# Patient Record
Sex: FEMALE | Race: WHITE | Employment: FULL TIME | URBAN - METROPOLITAN AREA
[De-identification: names, ages, dates, MRNs, and addresses within clinical notes are randomized per-mention and may not be internally consistent; named-entity substitution may affect disease eponyms.]

---

## 2017-08-11 DIAGNOSIS — Z13.220 SCREENING CHOLESTEROL LEVEL: ICD-10-CM

## 2017-08-11 DIAGNOSIS — Z00.00 ROUTINE GENERAL MEDICAL EXAMINATION AT A HEALTH CARE FACILITY: Primary | ICD-10-CM

## 2017-08-11 DIAGNOSIS — Z13.29 THYROID DISORDER SCREEN: ICD-10-CM

## 2017-08-11 DIAGNOSIS — E55.9 VITAMIN D DEFICIENCY: ICD-10-CM

## 2017-08-14 ENCOUNTER — LAB ONLY (OUTPATIENT)
Dept: INTERNAL MEDICINE CLINIC | Age: 32
End: 2017-08-14

## 2017-08-14 ENCOUNTER — HOSPITAL ENCOUNTER (OUTPATIENT)
Dept: LAB | Age: 32
Discharge: HOME | End: 2017-08-14
Payer: COMMERCIAL

## 2017-08-14 DIAGNOSIS — E55.9 VITAMIN D DEFICIENCY: ICD-10-CM

## 2017-08-14 DIAGNOSIS — Z13.29 THYROID DISORDER SCREEN: ICD-10-CM

## 2017-08-14 DIAGNOSIS — Z00.00 ROUTINE GENERAL MEDICAL EXAMINATION AT A HEALTH CARE FACILITY: Primary | ICD-10-CM

## 2017-08-14 DIAGNOSIS — Z00.00 ROUTINE GENERAL MEDICAL EXAMINATION AT A HEALTH CARE FACILITY: ICD-10-CM

## 2017-08-14 LAB
25(OH)D3 SERPL-MCNC: 29.2 NG/ML (ref 30–100)
ALBUMIN SERPL BCP-MCNC: 3.8 G/DL (ref 3.4–5)
ALBUMIN/GLOB SERPL: 1.3 {RATIO} (ref 0.8–1.7)
ALP SERPL-CCNC: 62 U/L (ref 45–117)
ALT SERPL-CCNC: 17 U/L (ref 13–56)
ANION GAP BLD CALC-SCNC: 9 MMOL/L (ref 3–18)
AST SERPL W P-5'-P-CCNC: 11 U/L (ref 15–37)
BASOPHILS # BLD AUTO: 0 K/UL (ref 0–0.06)
BASOPHILS # BLD: 1 % (ref 0–2)
BILIRUB SERPL-MCNC: 0.5 MG/DL (ref 0.2–1)
BUN SERPL-MCNC: 8 MG/DL (ref 7–18)
BUN/CREAT SERPL: 13 (ref 12–20)
CALCIUM SERPL-MCNC: 8.7 MG/DL (ref 8.5–10.1)
CHLORIDE SERPL-SCNC: 106 MMOL/L (ref 100–108)
CHOLEST SERPL-MCNC: 181 MG/DL
CO2 SERPL-SCNC: 24 MMOL/L (ref 21–32)
CREAT SERPL-MCNC: 0.6 MG/DL (ref 0.6–1.3)
DIFFERENTIAL METHOD BLD: ABNORMAL
EOSINOPHIL # BLD: 0.3 K/UL (ref 0–0.4)
EOSINOPHIL NFR BLD: 6 % (ref 0–5)
ERYTHROCYTE [DISTWIDTH] IN BLOOD BY AUTOMATED COUNT: 13.3 % (ref 11.6–14.5)
GLOBULIN SER CALC-MCNC: 3 G/DL (ref 2–4)
GLUCOSE SERPL-MCNC: 80 MG/DL (ref 74–99)
HCT VFR BLD AUTO: 40.6 % (ref 35–45)
HDLC SERPL-MCNC: 89 MG/DL (ref 40–60)
HDLC SERPL: 2 {RATIO} (ref 0–5)
HGB BLD-MCNC: 13.1 G/DL (ref 12–16)
LDLC SERPL CALC-MCNC: 82.2 MG/DL (ref 0–100)
LIPID PROFILE,FLP: ABNORMAL
LYMPHOCYTES # BLD AUTO: 34 % (ref 21–52)
LYMPHOCYTES # BLD: 1.5 K/UL (ref 0.9–3.6)
MCH RBC QN AUTO: 30.8 PG (ref 24–34)
MCHC RBC AUTO-ENTMCNC: 32.3 G/DL (ref 31–37)
MCV RBC AUTO: 95.3 FL (ref 74–97)
MONOCYTES # BLD: 0.3 K/UL (ref 0.05–1.2)
MONOCYTES NFR BLD AUTO: 7 % (ref 3–10)
NEUTS SEG # BLD: 2.3 K/UL (ref 1.8–8)
NEUTS SEG NFR BLD AUTO: 52 % (ref 40–73)
PLATELET # BLD AUTO: 404 K/UL (ref 135–420)
PMV BLD AUTO: 9 FL (ref 9.2–11.8)
POTASSIUM SERPL-SCNC: 4.8 MMOL/L (ref 3.5–5.5)
PROT SERPL-MCNC: 6.8 G/DL (ref 6.4–8.2)
RBC # BLD AUTO: 4.26 M/UL (ref 4.2–5.3)
SODIUM SERPL-SCNC: 139 MMOL/L (ref 136–145)
TRIGL SERPL-MCNC: 49 MG/DL (ref ?–150)
TSH SERPL DL<=0.05 MIU/L-ACNC: 1.39 UIU/ML (ref 0.36–3.74)
VLDLC SERPL CALC-MCNC: 9.8 MG/DL
WBC # BLD AUTO: 4.4 K/UL (ref 4.6–13.2)

## 2017-08-14 PROCEDURE — 84443 ASSAY THYROID STIM HORMONE: CPT | Performed by: NURSE PRACTITIONER

## 2017-08-14 PROCEDURE — 85025 COMPLETE CBC W/AUTO DIFF WBC: CPT | Performed by: NURSE PRACTITIONER

## 2017-08-14 PROCEDURE — 82306 VITAMIN D 25 HYDROXY: CPT | Performed by: NURSE PRACTITIONER

## 2017-08-14 PROCEDURE — 80061 LIPID PANEL: CPT | Performed by: NURSE PRACTITIONER

## 2017-08-14 PROCEDURE — 80053 COMPREHEN METABOLIC PANEL: CPT | Performed by: NURSE PRACTITIONER

## 2017-08-17 ENCOUNTER — OFFICE VISIT (OUTPATIENT)
Dept: INTERNAL MEDICINE CLINIC | Age: 32
End: 2017-08-17

## 2017-08-17 VITALS
SYSTOLIC BLOOD PRESSURE: 106 MMHG | HEIGHT: 67 IN | HEART RATE: 79 BPM | WEIGHT: 190 LBS | DIASTOLIC BLOOD PRESSURE: 75 MMHG | BODY MASS INDEX: 29.82 KG/M2 | RESPIRATION RATE: 15 BRPM | TEMPERATURE: 98.6 F | OXYGEN SATURATION: 97 %

## 2017-08-17 DIAGNOSIS — Z00.00 ROUTINE PHYSICAL EXAMINATION: Primary | ICD-10-CM

## 2017-08-17 DIAGNOSIS — E55.9 VITAMIN D DEFICIENCY: ICD-10-CM

## 2017-08-17 RX ORDER — DOXYCYCLINE HYCLATE 20 MG
TABLET ORAL
Refills: 0 | COMMUNITY
Start: 2017-07-06 | End: 2018-03-27 | Stop reason: ALTCHOICE

## 2017-08-17 RX ORDER — CHLORHEXIDINE GLUCONATE 1.2 MG/ML
RINSE ORAL
Refills: 0 | COMMUNITY
Start: 2017-07-06 | End: 2018-11-10

## 2017-08-17 NOTE — PROGRESS NOTES
[address] [place]  presents today for   Chief Complaint   Patient presents with    Complete Physical       [facility] for [facility] care discussion is english/other. Is someone accompanying this pt? no    Is the patient using any DME equipment during OV? no    Depression Screening:  PHQ over the last two weeks 10/21/2015 3/11/2014   Little interest or pleasure in doing things Not at all Not at all   Feeling down, depressed or hopeless Not at all Not at all   Total Score PHQ 2 0 0       Learning Assessment:  Learning Assessment 3/11/2014   PRIMARY LEARNER Patient   PRIMARY LANGUAGE ENGLISH   LEARNER PREFERENCE PRIMARY LISTENING   ANSWERED BY patient   RELATIONSHIP SELF       Abuse Screening:  No flowsheet data found. Fall Risk  No flowsheet data found. Health Maintenance reviewed and discussed per provider. Yes    Pt will discuss HM due with PCP      Advance Directive:  1. Do you have an advance directive in place? Patient Reply: no    2. If not, would you like material regarding how to put one in place? Patient Reply: no    Coordination of Care:  1. Have you been to the ER, urgent care clinic since your last visit? Hospitalized since your last visit? no    2. Have you seen or consulted any other health care providers outside of the [address] [place] [address] since your last visit? Include any pap smears or colon screening.  no

## 2017-08-17 NOTE — PATIENT INSTRUCTIONS
Learning About Vitamin D  Why is it important to get enough vitamin D? Your body needs vitamin D to absorb calcium. Calcium keeps your bones and muscles, including your heart, healthy and strong. If your muscles don't get enough calcium, they can cramp, hurt, or feel weak. You may have long-term (chronic) muscle aches and pains. If you don't get enough vitamin D throughout life, you have an increased chance of having thin and brittle bones (osteoporosis) in your later years. Children who don't get enough vitamin D may not grow as much as others their age. They also have a chance of getting a rare disease called rickets. It causes weak bones. Vitamin D and calcium are added to many foods. And your body uses sunshine to make its own vitamin D. How much vitamin D do you need? The [place] of Medicine recommends that people ages 3 through 79 get 600 IU (international units) every day. Adults 71 and older need 800 IU every day. Blood tests for vitamin D can check your vitamin D level. But there is no standard normal range used by all laboratories. The [place] of Medicine recommends a blood level of 20 ng/mL of vitamin D for healthy bones. And most people in the [place] and [facility] ([facility]) meet this goal.  How can you get more vitamin D? Foods that contain vitamin D include:  · [place], tuna, and mackerel. These are some of the best foods to eat when you need to get more vitamin D.  · Cheese, egg yolks, and beef liver. These foods have vitamin D in small amounts. · Milk, soy drinks, orange juice, yogurt, margarine, and some kinds of cereal have vitamin D added to them. Some people don't make vitamin D as well as others. They may have to take extra care in getting enough vitamin D. Things that reduce how much vitamin D your body makes include:  · Dark skin, such as many  Americans have. · Age, especially if you are older than 72. · Digestive problems, such as Crohn's or celiac disease.   · Liver and kidney disease. Some people who do not get enough vitamin D may need supplements. Are there any risks from taking vitamin D?  · Too much vitamin D:  ¨ Can damage your kidneys. ¨ Can cause nausea and vomiting, constipation, and weakness. ¨ Raises the amount of calcium in your blood. If this happens, you can get confused or have an irregular heart rhythm. · Vitamin D may interact with other medicines. Tell your doctor about all of the medicines you take, including over-the-counter drugs, herbs, and pills. Tell your doctor about all of your current medical problems. Where can you learn more? Go to http://haseebSan Diego Operamaurizio.info/. Enter [id number] in the search box to learn more about \"Learning About Vitamin D.\"  Current as of: July 26, 2016  Content Version: 11.3  © 2193-1045 Store-[email or web address].com. Care instructions adapted under license by [employer] (which disclaims liability or warranty for this information). If you have questions about a medical condition or this instruction, always ask your healthcare professional. [place] [address] any warranty or liability for your use of this information. Start taking Vitamin D3 6000units/daily or 50,000units once a week for 8 weeks. Then continue with 1500 to 2000units/day.

## 2017-08-17 NOTE — MR AVS SNAPSHOT
Visit Information Date & Time Provider Department Dept. Phone Encounter #  
 8/17/2017  7:30 AM Corin Soto NP [address] [phone] [id number] Follow-up Instructions Return in about 6 months (around 2/17/2018), or if symptoms worsen or fail to improve, for Labs. Your Appointments 8/22/2017  9:00 AM  
ANNUAL with Figueroa Beavers DO  
[address] [place] [address] ([address] [place] [address]) Appt Note: Annual  syb 08/02; pt r/s 08/15/17 appt  syb 08/15  
 [address] [phone]  
  
   
 [address] [phone] Upcoming Health Maintenance Date Due DTaP/Tdap/Td series (1 - Tdap) 7/26/2005 PAP AKA CERVICAL CYTOLOGY 7/26/2016 INFLUENZA AGE 9 TO ADULT 8/1/2017 Allergies as of 8/17/2017  Review Complete On: 8/17/2017 By: Roddy Lyman LPN No Known Allergies Current Immunizations  Never Reviewed Name Date Influenza Vaccine (Quad) PF 10/21/2015 Not reviewed this visit You Were Diagnosed With   
  
 Codes Comments Routine physical examination    -  Primary ICD-10-CM: Z00.00 ICD-9-CM: V70.0 Vitamin D deficiency     ICD-10-CM: E55.9 ICD-9-CM: 268.9 Vitals BP Pulse Temp Resp Height(growth percentile) Weight(growth percentile) 106/75 79 98.6 °F (37 °C) (Oral) 15 5' 7\" (1.702 m) 190 lb (86.2 kg) SpO2 BMI OB Status Smoking Status 97% 29.76 kg/m2 IUD Never Smoker BMI and BSA Data Body Mass Index Body Surface Area  
 29.76 kg/m 2 2.02 m 2 Preferred Pharmacy Pharmacy Name Phone [address] [place] [address] [phone] Your Updated Medication List  
  
   
This list is accurate as of: 8/17/17  7:46 AM.  Always use your most recent med list.  
  
  
  
  
 chlorhexidine 0.12 % solution Commonly known as:  PERIDEX RINSE WITH 1/2 OUNCES TWICE A DAY AFTER BRUSHING AND FLOSSING  
  
 doxycycline 20 mg tablet Commonly known as:  PERIOSTAT  
take 1 tablet by mouth twice a day until finished MIRENA 20 mcg/24 hr (5 years) IUD Generic drug:  levonorgestrel 1 Each by IntraUTERine route once. VITAMIN D3 1,000 unit tablet Generic drug:  cholecalciferol Take  by mouth daily. Follow-up Instructions Return in about 6 months (around 2/17/2018), or if symptoms worsen or fail to improve, for Labs. To-Do List   
 03/17/2018 Lab:  VITAMIN D, 25 HYDROXY Patient Instructions Learning About Vitamin D Why is it important to get enough vitamin D? Your body needs vitamin D to absorb calcium. Calcium keeps your bones and muscles, including your heart, healthy and strong. If your muscles don't get enough calcium, they can cramp, hurt, or feel weak. You may have long-term (chronic) muscle aches and pains. If you don't get enough vitamin D throughout life, you have an increased chance of having thin and brittle bones (osteoporosis) in your later years. Children who don't get enough vitamin D may not grow as much as others their age. They also have a chance of getting a rare disease called rickets. It causes weak bones. Vitamin D and calcium are added to many foods. And your body uses sunshine to make its own vitamin D. How much vitamin D do you need? The [place] of Medicine recommends that people ages 3 through 79 get 600 IU (international units) every day. Adults 71 and older need 800 IU every day. Blood tests for vitamin D can check your vitamin D level. But there is no standard normal range used by all laboratories. The [place] of Medicine recommends a blood level of 20 ng/mL of vitamin D for healthy bones. And most people in the [place] and [facility] ([facility]) meet this goal. 
How can you get more vitamin D? Foods that contain vitamin D include: 
· [place], tuna, and mackerel.  These are some of the best foods to eat when you need to get more vitamin D. 
· Cheese, egg yolks, and beef liver. These foods have vitamin D in small amounts. · Milk, soy drinks, orange juice, yogurt, margarine, and some kinds of cereal have vitamin D added to them. Some people don't make vitamin D as well as others. They may have to take extra care in getting enough vitamin D. Things that reduce how much vitamin D your body makes include: · Dark skin, such as many  Americans have. · Age, especially if you are older than 72. · Digestive problems, such as Crohn's or celiac disease. · Liver and kidney disease. Some people who do not get enough vitamin D may need supplements. Are there any risks from taking vitamin D? 
· Too much vitamin D: 
¨ Can damage your kidneys. ¨ Can cause nausea and vomiting, constipation, and weakness. ¨ Raises the amount of calcium in your blood. If this happens, you can get confused or have an irregular heart rhythm. · Vitamin D may interact with other medicines. Tell your doctor about all of the medicines you take, including over-the-counter drugs, herbs, and pills. Tell your doctor about all of your current medical problems. Where can you learn more? Go to http://haseebSiphonLabsmaurizio.info/. Enter [id number] in the search box to learn more about \"Learning About Vitamin D.\" 
Current as of: July 26, 2016 Content Version: 11.3 © 8768-2500 Aiming. Care instructions adapted under license by [employer] (which disclaims liability or warranty for this information). If you have questions about a medical condition or this instruction, always ask your healthcare professional. [place] [address] any warranty or liability for your use of this information. Start taking Vitamin D3 6000units/daily or 50,000units once a week for 8 weeks. Then continue with 1500 to 2000units/day. Introducing [facility] & HEALTH SERVICES! Dear Pedro Antoine: Thank you for requesting a Fishki account. Our records indicate that you already have an active Fishki account. You can access your account anytime at [email or web address] Did you know that you can access your hospital and ER discharge instructions at any time in Fishki? You can also review all of your test results from your hospital stay or ER visit. Additional Information If you have questions, please visit the Frequently Asked Questions section of the Fishki website at [email or web address] Remember, Fishki is NOT to be used for urgent needs. For medical emergencies, dial 911. Now available from your iPhone and Android! Please provide this summary of care documentation to your next provider. Your primary care clinician is listed as Estrellita Estrella. If you have any questions after today's visit, please call [phone].

## 2017-09-01 ENCOUNTER — OFFICE VISIT (OUTPATIENT)
Dept: INTERNAL MEDICINE CLINIC | Age: 32
End: 2017-09-01

## 2017-09-01 VITALS
RESPIRATION RATE: 16 BRPM | SYSTOLIC BLOOD PRESSURE: 126 MMHG | DIASTOLIC BLOOD PRESSURE: 84 MMHG | HEIGHT: 67 IN | TEMPERATURE: 98 F | OXYGEN SATURATION: 98 % | WEIGHT: 190 LBS | BODY MASS INDEX: 29.82 KG/M2 | HEART RATE: 81 BPM

## 2017-09-01 DIAGNOSIS — Z20.818 EXPOSURE TO STREP THROAT: ICD-10-CM

## 2017-09-01 DIAGNOSIS — J02.9 SORE THROAT: Primary | ICD-10-CM

## 2017-09-01 LAB
S PYO AG THROAT QL: NEGATIVE
VALID INTERNAL CONTROL?: YES

## 2017-09-01 RX ORDER — FLUTICASONE PROPIONATE 50 MCG
2 SPRAY, SUSPENSION (ML) NASAL DAILY
Qty: 1 BOTTLE | Refills: 1 | Status: SHIPPED | OUTPATIENT
Start: 2017-09-01 | End: 2018-03-27

## 2017-09-01 NOTE — PROGRESS NOTES
HISTORY OF PRESENT ILLNESS  Kim Negrete is a 34 y.o. female. HPI Comments: Patient presents today with c/o sore throat, onset 3 days ago, exposure to strep throat. She does not report any other symptoms today. Hx of strep throat in 2014 x3. No other pertinent history. Denies fever, chills, CP, SOB, cough, rhinorrhea, ear pain, n/v/d. Sore Throat    The history is provided by the patient. This is a new problem. The current episode started 2 days ago. The problem has not changed since onset. There has been no fever. Associated symptoms include trouble swallowing. Pertinent negatives include no diarrhea, no vomiting, no congestion, no ear discharge, no ear pain, no headaches, no shortness of breath, no swollen glands, no stiff neck and no cough. She has had exposure to strep. She has tried nothing for the symptoms. The treatment provided no relief. Review of Systems   Constitutional: Negative for chills and fever. HENT: Positive for sore throat and trouble swallowing. Negative for congestion, ear discharge and ear pain. Respiratory: Negative for cough and shortness of breath. Cardiovascular: Negative for chest pain. Gastrointestinal: Negative for abdominal pain, diarrhea, nausea and vomiting. Neurological: Negative for headaches. Physical Exam   Constitutional: Vital signs are normal. She appears well-developed and well-nourished. HENT:   Head: Normocephalic and atraumatic. Right Ear: External ear normal.   Left Ear: External ear normal.   Nose: Mucosal edema present. Mouth/Throat: Posterior oropharyngeal edema and posterior oropharyngeal erythema present. No oropharyngeal exudate or tonsillar abscesses. Eyes: Conjunctivae are normal.   Neck: No thyromegaly present. Cardiovascular: Normal rate, regular rhythm and normal heart sounds. Pulmonary/Chest: Effort normal and breath sounds normal. No respiratory distress.    Lymphadenopathy:     She has no cervical adenopathy. Visit Vitals    /84 (BP 1 Location: Right arm, BP Patient Position: Sitting)    Pulse 81    Temp 98 °F (36.7 °C) (Oral)    Resp 16    Ht 5' 7\" (1.702 m)    Wt 190 lb (86.2 kg)    SpO2 98%    BMI 29.76 kg/m2      Recent Results (from the past 12 hour(s))   AMB POC RAPID STREP A    Collection Time: 09/01/17  8:28 AM   Result Value Ref Range    VALID INTERNAL CONTROL POC Yes     Group A Strep Ag Negative Negative      ASSESSMENT and PLAN    ICD-10-CM ICD-9-CM    1. Sore throat J02.9 462 AMB POC RAPID STREP A      fluticasone (FLONASE) 50 mcg/actuation nasal spray   2. Exposure to strep throat Z20.818 V01.89 AMB POC RAPID STREP A     Reviewed plan with patient including diagnoses, treatment and follow up. Provided AVS with education on above diagnoses- symptomatic tx with NSAIDs, humidifier, lozenges. No further questions/concerns at this time. Pt to follow up as scheduled or sooner if symptoms worsen/fail to improve.

## 2017-09-01 NOTE — MR AVS SNAPSHOT
Visit Information Date & Time Provider Department Dept. Phone Encounter #  
 9/1/2017  8:15 AM Alphonso Barone NP [address] [phone] [id number] Follow-up Instructions Return if symptoms worsen or fail to improve. Upcoming Health Maintenance Date Due DTaP/Tdap/Td series (1 - Tdap) 7/26/2005 PAP AKA CERVICAL CYTOLOGY 7/26/2016 INFLUENZA AGE 9 TO ADULT 8/1/2017 Allergies as of 9/1/2017  Review Complete On: 9/1/2017 By: Jasiel Salmeron No Known Allergies Current Immunizations  Never Reviewed Name Date Influenza Vaccine (Quad) PF 10/21/2015 Not reviewed this visit You Were Diagnosed With   
  
 Codes Comments Sore throat    -  Primary ICD-10-CM: J02.9 ICD-9-CM: 066 Exposure to strep throat     ICD-10-CM: Z20.818 ICD-9-CM: V01.89 Vitals BP Pulse Temp Resp Height(growth percentile) Weight(growth percentile) 126/84 (BP 1 Location: Right arm, BP Patient Position: Sitting) 81 98 °F (36.7 °C) (Oral) 16 5' 7\" (1.702 m) 190 lb (86.2 kg) SpO2 BMI OB Status Smoking Status 98% 29.76 kg/m2 IUD Never Smoker Vitals History BMI and BSA Data Body Mass Index Body Surface Area  
 29.76 kg/m 2 2.02 m 2 Preferred Pharmacy Pharmacy Name Phone [address] [place] [address] [phone] Your Updated Medication List  
  
   
This list is accurate as of: 9/1/17  8:53 AM.  Always use your most recent med list.  
  
  
  
  
 chlorhexidine 0.12 % solution Commonly known as:  PERIDEX RINSE WITH 1/2 OUNCES TWICE A DAY AFTER BRUSHING AND FLOSSING  
  
 doxycycline 20 mg tablet Commonly known as:  PERIOSTAT  
take 1 tablet by mouth twice a day until finished  
  
 fluticasone 50 mcg/actuation nasal spray Commonly known as:  Matteo Luz 2 Sprays by Both Nostrils route daily. MIRENA 20 mcg/24 hr (5 years) IUD Generic drug:  levonorgestrel 1 Each by IntraUTERine route once. Prescriptions Sent to Pharmacy Refills  
 fluticasone (FLONASE) 50 mcg/actuation nasal spray 1 Si Sprays by Both Nostrils route daily. Class: Normal  
 Pharmacy: [address], [address] [place] [address] Ebony.  #: [phone] Route: Both Nostrils We Performed the Following AMB POC RAPID STREP A [66726 CPT(R)] Follow-up Instructions Return if symptoms worsen or fail to improve. Patient Instructions Sore Throat: Care Instructions Your Care Instructions Infection by bacteria or a virus causes most sore throats. Cigarette smoke, dry air, air pollution, allergies, and yelling can also cause a sore throat. Sore throats can be painful and annoying. Fortunately, most sore throats go away on their own. If you have a bacterial infection, your doctor may prescribe antibiotics. Follow-up care is a key part of your treatment and safety. Be sure to make and go to all appointments, and call your doctor if you are having problems. It's also a good idea to know your test results and keep a list of the medicines you take. How can you care for yourself at home? · If your doctor prescribed antibiotics, take them as directed. Do not stop taking them just because you feel better. You need to take the full course of antibiotics. · Gargle with warm salt water once an hour to help reduce swelling and relieve discomfort. Use 1 teaspoon of salt mixed in 1 cup of warm water. · Take an over-the-counter pain medicine, such as acetaminophen (Tylenol), ibuprofen (Advil, Motrin), or naproxen (Aleve). Read and follow all instructions on the label. · Be careful when taking over-the-counter cold or flu medicines and Tylenol at the same time. Many of these medicines have acetaminophen, which is Tylenol. Read the labels to make sure that you are not taking more than the recommended dose.  Too much acetaminophen (Tylenol) can be harmful. · Drink plenty of fluids. Fluids may help soothe an irritated throat. Hot fluids, such as tea or soup, may help decrease throat pain. · Use over-the-counter throat lozenges to soothe pain. Regular cough drops or hard candy may also help. These should not be given to young children because of the risk of choking. · Do not smoke or allow others to smoke around you. If you need help quitting, talk to your doctor about stop-smoking programs and medicines. These can increase your chances of quitting for good. · Use a vaporizer or humidifier to add moisture to your bedroom. Follow the directions for cleaning the machine. When should you call for help? Call your doctor now or seek immediate medical care if: 
· You have new or worse trouble swallowing. · Your sore throat gets much worse on one side. Watch closely for changes in your health, and be sure to contact your doctor if you do not get better as expected. Where can you learn more? Go to http://haseeb-maurizio.info/. Enter [id number] in the search box to learn more about \"Sore Throat: Care Instructions. \" Current as of: July 29, 2016 Content Version: 11.3 © 7912-7398 Waste2Tricity, Tengaged. Care instructions adapted under license by [employer] (which disclaims liability or warranty for this information). If you have questions about a medical condition or this instruction, always ask your healthcare professional. [place] [address] any warranty or liability for your use of this information. Introducing [facility] & HEALTH SERVICES! Dear Joan Britt: 
Thank you for requesting a Qwenty account. Our records indicate that you already have an active Qwenty account. You can access your account anytime at [email or web address] [email or web address] Did you know that you can access your hospital and ER discharge instructions at any time in Qwenty?   You can also review all of your test results from your hospital stay or ER visit. Additional Information If you have questions, please visit the Frequently Asked Questions section of the Zaask website at [email or web address] Remember, Zaask is NOT to be used for urgent needs. For medical emergencies, dial 911. Now available from your iPhone and Android! Please provide this summary of care documentation to your next provider. Your primary care clinician is listed as Estrellita Estrella. If you have any questions after today's visit, please call [phone].

## 2017-09-01 NOTE — PATIENT INSTRUCTIONS
Sore Throat: Care Instructions  Your Care Instructions    Infection by bacteria or a virus causes most sore throats. Cigarette smoke, dry air, air pollution, allergies, and yelling can also cause a sore throat. Sore throats can be painful and annoying. Fortunately, most sore throats go away on their own. If you have a bacterial infection, your doctor may prescribe antibiotics. Follow-up care is a key part of your treatment and safety. Be sure to make and go to all appointments, and call your doctor if you are having problems. It's also a good idea to know your test results and keep a list of the medicines you take. How can you care for yourself at home? · If your doctor prescribed antibiotics, take them as directed. Do not stop taking them just because you feel better. You need to take the full course of antibiotics. · Gargle with warm salt water once an hour to help reduce swelling and relieve discomfort. Use 1 teaspoon of salt mixed in 1 cup of warm water. · Take an over-the-counter pain medicine, such as acetaminophen (Tylenol), ibuprofen (Advil, Motrin), or naproxen (Aleve). Read and follow all instructions on the label. · Be careful when taking over-the-counter cold or flu medicines and Tylenol at the same time. Many of these medicines have acetaminophen, which is Tylenol. Read the labels to make sure that you are not taking more than the recommended dose. Too much acetaminophen (Tylenol) can be harmful. · Drink plenty of fluids. Fluids may help soothe an irritated throat. Hot fluids, such as tea or soup, may help decrease throat pain. · Use over-the-counter throat lozenges to soothe pain. Regular cough drops or hard candy may also help. These should not be given to young children because of the risk of choking. · Do not smoke or allow others to smoke around you. If you need help quitting, talk to your doctor about stop-smoking programs and medicines.  These can increase your chances of quitting for good. · Use a vaporizer or humidifier to add moisture to your bedroom. Follow the directions for cleaning the machine. When should you call for help? Call your doctor now or seek immediate medical care if:  · You have new or worse trouble swallowing. · Your sore throat gets much worse on one side. Watch closely for changes in your health, and be sure to contact your doctor if you do not get better as expected. Where can you learn more? Go to http://haseeb-maurizio.info/. Enter [id number] in the search box to learn more about \"Sore Throat: Care Instructions. \"  Current as of: July 29, 2016  Content Version: 11.3  © 4944-1734 Nonpareil, [employer]orporated. Care instructions adapted under license by Rising (which disclaims liability or warranty for this information). If you have questions about a medical condition or this instruction, always ask your healthcare professional. [address] any warranty or liability for your use of this information.

## 2017-09-01 NOTE — PROGRESS NOTES
Cher Ellis presents today for   Chief Complaint   Patient presents with    Sore Throat     exposed to [address] preferred language for health care discussion is english/other. Is someone accompanying this pt? no    Is the patient using any DME equipment during OV? no    Depression Screening:  PHQ over the last two weeks 10/21/2015 3/11/2014   Little interest or pleasure in doing things Not at all Not at all   Feeling down, depressed or hopeless Not at all Not at all   Total Score PHQ 2 0 0       Learning Assessment:  Learning Assessment 3/11/2014   PRIMARY LEARNER Patient   PRIMARY LANGUAGE ENGLISH   LEARNER PREFERENCE PRIMARY LISTENING   ANSWERED BY patient   RELATIONSHIP SELF       Abuse Screening:  No flowsheet data found. Health Maintenance reviewed and discussed per provider. Yes    Cher Ellis is due for tdap, pap has ob/gyn, influenza declined. Please order/place referral if appropriate. Advance Directive:  1. Do you have an advance directive in place? Patient Reply: no    2. If not, would you like material regarding how to put one in place? Patient Reply: has papers    Coordination of Care:  1. Have you been to the ER, urgent care clinic since your last visit? Hospitalized since your last visit? no    2. Have you seen or consulted any other health care providers outside of the [address] [place] [address] since your last visit? Include any pap smears or colon screening.  no

## 2018-01-31 ENCOUNTER — OFFICE VISIT (OUTPATIENT)
Dept: INTERNAL MEDICINE CLINIC | Age: 33
End: 2018-01-31

## 2018-01-31 ENCOUNTER — HOSPITAL ENCOUNTER (OUTPATIENT)
Dept: LAB | Age: 33
Discharge: HOME | End: 2018-01-31

## 2018-01-31 VITALS
OXYGEN SATURATION: 100 % | RESPIRATION RATE: 16 BRPM | DIASTOLIC BLOOD PRESSURE: 83 MMHG | SYSTOLIC BLOOD PRESSURE: 112 MMHG | HEART RATE: 80 BPM | WEIGHT: 198 LBS | BODY MASS INDEX: 31.08 KG/M2 | HEIGHT: 67 IN | TEMPERATURE: 98 F

## 2018-01-31 DIAGNOSIS — M79.89 SWELLING OF BOTH HANDS: ICD-10-CM

## 2018-01-31 DIAGNOSIS — E55.9 VITAMIN D DEFICIENCY: ICD-10-CM

## 2018-01-31 DIAGNOSIS — R21 RASH: Primary | ICD-10-CM

## 2018-01-31 PROCEDURE — 99001 SPECIMEN HANDLING PT-LAB: CPT | Performed by: NURSE PRACTITIONER

## 2018-01-31 RX ORDER — HYDROCORTISONE 25 MG/ML
LOTION TOPICAL 2 TIMES DAILY
Qty: 60 ML | Refills: 0 | Status: SHIPPED | OUTPATIENT
Start: 2018-01-31 | End: 2018-11-10

## 2018-01-31 NOTE — PROGRESS NOTES
HISTORY OF PRESENT ILLNESS  Lacho Polo is a 34 y.o. female. Patient presents with bilateral hand redness, swelling approximately for a month, patient not sure on exact duration. Patient denies any redness any where else, denies using any new detergent,chemical or new food. Patient denies any itching, pain,fever,chills, NVD,dizziness, SOB,CP. Consulted with Dr Zen Leavitt who concurs with plan with work patient up for RA and treat with low dose steroid cream.    Skin Problem   The history is provided by the patient. This is a new problem. The current episode started more than 1 week ago. The problem has not changed since onset. Pertinent negatives include no chest pain, no abdominal pain, no headaches and no shortness of breath. Hand Swelling    The history is provided by the patient. This is a new problem. The current episode started more than 1 week ago. The problem has not changed since onset. The pain is present in the left hand and right hand. She has tried nothing for the symptoms. Review of Systems   Constitutional: Negative. HENT: Negative. Eyes: Negative. Respiratory: Negative for shortness of breath. Cardiovascular: Negative. Negative for chest pain. Gastrointestinal: Negative for abdominal pain. Musculoskeletal: Negative. Skin:        Erythema to dorsal aspect of bilateral hand from wrist to fingers   Neurological: Negative. Negative for headaches. Psychiatric/Behavioral: Negative. Physical Exam   Constitutional: She appears well-developed. HENT:   Head: Normocephalic. Eyes: Pupils are equal, round, and reactive to light. Neck: Normal range of motion. Cardiovascular: Normal rate. Pulmonary/Chest: Effort normal.   Abdominal: Soft. Bowel sounds are normal.   Musculoskeletal: She exhibits edema. Arms:  Mild swelling to bilateral hand and digits. Neurological: GCS eye subscore is 4. GCS verbal subscore is 5. GCS motor subscore is 6.    Skin: There is erythema. Psychiatric: She has a normal mood and affect. Her speech is normal and behavior is normal. Judgment normal. Cognition and memory are normal.       ASSESSMENT and PLAN    ICD-10-CM ICD-9-CM    1. Rash R21 782.1 SED RATE (ESR)      RHEUMATOID FACTOR, QL      CUONG COMPREHENSIVE PANEL      hydrocortisone (HYTONE) 2.5 % lotion      CBC WITH AUTOMATED DIFF      SED RATE (ESR)      RHEUMATOID FACTOR, QL      CUONG COMPREHENSIVE PANEL      CBC WITH AUTOMATED DIFF   2. Swelling of both hands M79.89 729.81 SED RATE (ESR)      RHEUMATOID FACTOR, QL      CUONG COMPREHENSIVE PANEL      hydrocortisone (HYTONE) 2.5 % lotion      CBC WITH AUTOMATED DIFF      SED RATE (ESR)      RHEUMATOID FACTOR, QL      CUONG COMPREHENSIVE PANEL      CBC WITH AUTOMATED DIFF   3. Vitamin D deficiency E55.9 268.9 VITAMIN D, 25 HYDROXY     Encounter Diagnoses   Name Primary?  Rash Yes    Swelling of both hands     Vitamin D deficiency      Orders Placed This Encounter    SED RATE (ESR)    RHEUMATOID FACTOR, QL    CUONG COMPREHENSIVE PANEL    CBC WITH AUTOMATED DIFF    hydrocortisone (HYTONE) 2.5 % lotion     Orders Placed This Encounter    SED RATE (ESR)     Standing Status:   Future     Number of Occurrences:   1     Standing Expiration Date:   2/1/2019    RHEUMATOID FACTOR, QL     Standing Status:   Future     Number of Occurrences:   1     Standing Expiration Date:   2/1/2019    CUONG COMPREHENSIVE PANEL     Standing Status:   Future     Number of Occurrences:   1     Standing Expiration Date:   2/1/2019    CBC WITH AUTOMATED DIFF     Standing Status:   Future     Number of Occurrences:   1     Standing Expiration Date:   2/1/2019    hydrocortisone (HYTONE) 2.5 % lotion     Sig: Apply  to affected area two (2) times a day.  use thin layer     Dispense:  60 mL     Refill:  0     Orders Placed This Encounter    SED RATE (ESR)    RHEUMATOID FACTOR, QL    CUONG COMPREHENSIVE PANEL    CBC WITH AUTOMATED DIFF    hydrocortisone (HYTONE) 2.5 % lotion     Diagnoses and all orders for this visit:    1. Rash  -     SED RATE (ESR); Future  -     RHEUMATOID FACTOR, QL; Future  -     CUONG COMPREHENSIVE PANEL; Future  -     hydrocortisone (HYTONE) 2.5 % lotion; Apply  to affected area two (2) times a day. use thin layer  -     CBC WITH AUTOMATED DIFF; Future    2. Swelling of both hands  -     SED RATE (ESR); Future  -     RHEUMATOID FACTOR, QL; Future  -     CUONG COMPREHENSIVE PANEL; Future  -     hydrocortisone (HYTONE) 2.5 % lotion; Apply  to affected area two (2) times a day. use thin layer  -     CBC WITH AUTOMATED DIFF; Future    3. Vitamin D deficiency  -     VITAMIN D, 25 HYDROXY      Follow-up Disposition:  Return if symptoms worsen or fail to improve.   current treatment plan is effective, no change in therapy  the following changes in treatment are made: Discuss possible dx with patient, R/O RA disease vs dermatitis

## 2018-01-31 NOTE — MR AVS SNAPSHOT
[facility] 
 
 
 [address] [facility] [address] 
[phone] Patient: Christiano Mae MRN: [id number] :1985 Visit Information Date & Time Provider Department Dept. Phone Encounter #  
 2018 11:00 AM Moy Good NP [facility] [phone] [id number] Follow-up Instructions Return if symptoms worsen or fail to improve. Upcoming Health Maintenance Date Due DTaP/Tdap/Td series (1 - Tdap) 2005 PAP AKA CERVICAL CYTOLOGY 2016 Influenza Age 5 to Adult 2017 Allergies as of 2018  Review Complete On: 2018 By: River Casas No Known Allergies Current Immunizations  Never Reviewed Name Date Influenza Vaccine (Quad) PF 10/21/2015 Not reviewed this visit You Were Diagnosed With   
  
 Codes Comments Rash    -  Primary ICD-10-CM: R21 
ICD-9-CM: 782.1 Swelling of both hands     ICD-10-CM: M79.89 ICD-9-CM: 729.81 Vitals BP Pulse Temp Resp Height(growth percentile) Weight(growth percentile) 112/83 (BP 1 Location: Right arm, BP Patient Position: Sitting) 80 98 °F (36.7 °C) (Oral) 16 5' 7\" (1.702 m) 198 lb (89.8 kg) SpO2 BMI OB Status Smoking Status 100% 31.01 kg/m2 IUD Never Smoker Vitals History BMI and BSA Data Body Mass Index Body Surface Area 31.01 kg/m 2 2.06 m 2 Preferred Pharmacy Pharmacy Name Phone [address] [place] [address] [phone] Your Updated Medication List  
  
   
This list is accurate as of: 18 12:08 PM.  Always use your most recent med list.  
  
  
  
  
 chlorhexidine 0.12 % solution Commonly known as:  PERIDEX RINSE WITH 1/2 OUNCES TWICE A DAY AFTER BRUSHING AND FLOSSING  
  
 doxycycline 20 mg tablet Commonly known as:  PERIOSTAT  
take 1 tablet by mouth twice a day until finished fluticasone 50 mcg/actuation nasal spray Commonly known as:  Fabiene Gift 2 Sprays by Both Nostrils route daily. hydrocortisone 2.5 % lotion Commonly known as:  HYTONE Apply  to affected area two (2) times a day. use thin layer MIRENA 20 mcg/24 hr (5 years) Iud  
Generic drug:  levonorgestrel 1 Each by IntraUTERine route once. Prescriptions Sent to Pharmacy Refills  
 hydrocortisone (HYTONE) 2.5 % lotion 0 Sig: Apply  to affected area two (2) times a day. use thin layer Class: Normal  
 Pharmacy: [address] [place] [address] [address], [address] [place] [address]. Ph #: [phone] Route: Topical  
  
Follow-up Instructions Return if symptoms worsen or fail to improve. To-Do List   
 01/31/2018 Lab:  CUONG COMPREHENSIVE PANEL   
  
 01/31/2018 Lab:  CBC WITH AUTOMATED DIFF   
  
 01/31/2018 Lab:  RHEUMATOID FACTOR, QL   
  
 01/31/2018 Lab:  SED RATE (ESR) Patient Instructions Sedimentation Rate: About This Test 
What is it? The sedimentation rate (sed rate) test is a blood test that measures how quickly red blood cells settle in a test tube. When inflammation is present in the body, the red blood cells stick together and fall more quickly than normal to the bottom of the tube. Why is this test done? A sed rate test is done to: · Find out if inflammation is present. · Check on the progress of a disease. · See how well a treatment is working. How can you prepare for the test? 
· In general, you don't need to prepare before having this test. Your doctor may give you some specific instructions. What happens during the test? 
· A health professional takes a sample of your blood. What else should you know about the test? 
· Your results will include an explanation of what a \"normal\" result is. This is called a \"reference range. \" It is just a guide.  Your doctor will evaluate your results based on your health and other factors. This means that a value that falls outside the normal values listed may still be normal for you. · There are many possible causes of a high sed rate. For this reason, the test is done with other tests to confirm a diagnosis. · Some diseases that cause inflammation don't increase the sed rate, so a normal sed rate doesn't always rule out a disease. How long does the test take? · The test will take a few minutes. What happens after the test? 
· You will probably be able to go home right away. · You can go back to your usual activities right away. Follow-up care is a key part of your treatment and safety. Be sure to make and go to all appointments, and call your doctor if you are having problems. It's also a good idea to keep a list of the medicines you take. Ask your doctor when you can expect to have your test results. Where can you learn more? Go to http://haseeb-maurizio.info/. Enter F567 in the search box to learn more about \"Sedimentation Rate: About This Test.\" Current as of: October 31, 2016 Content Version: 11.4 © 5355-7940 [employer]. Care instructions adapted under license by [employer] (which disclaims liability or warranty for this information). If you have questions about a medical condition or this instruction, always ask your healthcare professional. [address] any warranty or liability for your use of this information. Rash: Care Instructions Your Care Instructions A rash is any irritation or inflammation of the skin. Rashes have many possible causes, including allergy, infection, illness, heat, and emotional stress. Follow-up care is a key part of your treatment and safety. Be sure to make and go to all appointments, and call your doctor if you are having problems.  It's also a good idea to know your test results and keep a list of the medicines you take. How can you care for yourself at home? · Wash the area with water only. Soap can make dryness and itching worse. Pat dry. · Put cold, wet cloths on the rash to reduce itching. · Keep cool, and stay out of the sun. · Leave the rash open to the air as much of the time as possible. · Sometimes petroleum jelly (Vaseline) can help relieve the discomfort caused by a rash. A moisturizing lotion, such as Cetaphil, also may help. Calamine lotion may help for rashes caused by contact with something (such as a plant or soap) that irritated the skin. Use it 3 or 4 times a day. · If your doctor prescribed a cream, use it as directed. If your doctor prescribed medicine, take it exactly as directed. · If your rash itches so badly that it interferes with your normal activities, take an over-the-counter antihistamine, such as diphenhydramine (Benadryl) or loratadine (Claritin). Read and follow all instructions on the label. When should you call for help? Call your doctor now or seek immediate medical care if: 
? · You have signs of infection, such as: 
¨ Increased pain, swelling, warmth, or redness. ¨ Red streaks leading from the area. ¨ Pus draining from the area. ¨ A fever. ? · You have joint pain along with the rash. ? Watch closely for changes in your health, and be sure to contact your doctor if: 
? · Your rash is changing or getting worse. For example, call if you have pain along with the rash, the rash is spreading, or you have new blisters. ? · You do not get better after 1 week. Where can you learn more? Go to http://haseeb-maurizio.info/. Enter M745 in the search box to learn more about \"Rash: Care Instructions. \" Current as of: October 13, 2016 Content Version: 11.4 © 1115-7079 [employer].  Care instructions adapted under license by [employer] (which disclaims liability or warranty for this information). If you have questions about a medical condition or this instruction, always ask your healthcare professional. [address] any warranty or liability for your use of this information. Introducing [facility]! Dear Akua Quintana: 
Thank you for requesting a [employer] account. Our records indicate that you already have an active [employer] account. You can access your account anytime at [email or web address] Did you know that you can access your hospital and ER discharge instructions at any time in [employer]? You can also review all of your test results from your hospital stay or ER visit. Additional Information If you have questions, please visit the Frequently Asked Questions section of the [employer] website at [email or web address] Remember, [employer] is NOT to be used for urgent needs. For medical emergencies, dial 911. Now available from your iPhone and Android! Please provide this summary of care documentation to your next provider. Your primary care clinician is listed as Estrellita Estrella. If you have any questions after today's visit, please call [phone].

## 2018-01-31 NOTE — PROGRESS NOTES
Patient presents with bilateral hand redness, swelling approximately for a month, patient not sure on exact duration. Patient denies any redness any where else, denies using any new detergent,chemical or new food. Patient denies any itching, pain,fever,chills, NVD,dizziness, SOB,CP.  Consulted with Dr Irlanda Pat who concurs with plan with work patient up for RA and treat with low dose steroid cream.

## 2018-01-31 NOTE — PROGRESS NOTES
ROOM # 125 [facility] presents today for   Chief Complaint   Patient presents with    Skin Problem     redness in both hand x 1 month     Hand Swelling       Maddie Bailey preferred language for health care discussion is english/other. Is someone accompanying this pt? no    Is the patient using any DME equipment during OV? no    Depression Screening:  PHQ over the last two weeks 10/21/2015 3/11/2014   Little interest or pleasure in doing things Not at all Not at all   Feeling down, depressed or hopeless Not at all Not at all   Total Score PHQ 2 0 0       Learning Assessment:  Learning Assessment 3/11/2014   PRIMARY LEARNER Patient   PRIMARY LANGUAGE ENGLISH   LEARNER PREFERENCE PRIMARY LISTENING   ANSWERED BY patient   RELATIONSHIP SELF       Abuse Screening:  n/i    Fall Risk  n/i    Health Maintenance reviewed and discussed per provider. Yes    Maddie Bailey is due for nothing at this time. Please order/place referral if appropriate. Advance Directive:  1. Do you have an advance directive in place? Patient Reply: no    2. If not, would you like material regarding how to put one in place? Patient Reply: no    Coordination of Care:  1. Have you been to the ER, urgent care clinic since your last visit? Hospitalized since your last visit? no    2. Have you seen or consulted any other health care providers outside of the [address] [place] [address] since your last visit? Include any pap smears or colon screening.  no

## 2018-01-31 NOTE — PATIENT INSTRUCTIONS
Sedimentation Rate: About This Test  What is it? The sedimentation rate (sed rate) test is a blood test that measures how quickly red blood cells settle in a test tube. When inflammation is present in the body, the red blood cells stick together and fall more quickly than normal to the bottom of the tube. Why is this test done? A sed rate test is done to:  · Find out if inflammation is present. · Check on the progress of a disease. · See how well a treatment is working. How can you prepare for the test?  · In general, you don't need to prepare before having this test. Your doctor may give you some specific instructions. What happens during the test?  · A health professional takes a sample of your blood. What else should you know about the test?  · Your results will include an explanation of what a \"normal\" result is. This is called a \"reference range. \" It is just a guide. Your doctor will evaluate your results based on your health and other factors. This means that a value that falls outside the normal values listed may still be normal for you. · There are many possible causes of a high sed rate. For this reason, the test is done with other tests to confirm a diagnosis. · Some diseases that cause inflammation don't increase the sed rate, so a normal sed rate doesn't always rule out a disease. How long does the test take? · The test will take a few minutes. What happens after the test?  · You will probably be able to go home right away. · You can go back to your usual activities right away. Follow-up care is a key part of your treatment and safety. Be sure to make and go to all appointments, and call your doctor if you are having problems. It's also a good idea to keep a list of the medicines you take. Ask your doctor when you can expect to have your test results. Where can you learn more? Go to http://haseeb-maurizio.info/.   Enter Z356 in the search box to learn more about \"Sedimentation Rate: About This Test.\"  Current as of: October 31, 2016  Content Version: 11.4  © 6934-0472 [employer]. Care instructions adapted under license by [employer] (which disclaims liability or warranty for this information). If you have questions about a medical condition or this instruction, always ask your healthcare professional. [address] any warranty or liability for your use of this information. Rash: Care Instructions  Your Care Instructions  A rash is any irritation or inflammation of the skin. Rashes have many possible causes, including allergy, infection, illness, heat, and emotional stress. Follow-up care is a key part of your treatment and safety. Be sure to make and go to all appointments, and call your doctor if you are having problems. It's also a good idea to know your test results and keep a list of the medicines you take. How can you care for yourself at home? · Wash the area with water only. Soap can make dryness and itching worse. Pat dry. · Put cold, wet cloths on the rash to reduce itching. · Keep cool, and stay out of the sun. · Leave the rash open to the air as much of the time as possible. · Sometimes petroleum jelly (Vaseline) can help relieve the discomfort caused by a rash. A moisturizing lotion, such as Cetaphil, also may help. Calamine lotion may help for rashes caused by contact with something (such as a plant or soap) that irritated the skin. Use it 3 or 4 times a day. · If your doctor prescribed a cream, use it as directed. If your doctor prescribed medicine, take it exactly as directed. · If your rash itches so badly that it interferes with your normal activities, take an over-the-counter antihistamine, such as diphenhydramine (Benadryl) or loratadine (Claritin). Read and follow all instructions on the label. When should you call for help?   Call your doctor now or seek immediate medical care if:  ? · You have signs of infection, such as:  ¨ Increased pain, swelling, warmth, or redness. ¨ Red streaks leading from the area. ¨ Pus draining from the area. ¨ A fever. ? · You have joint pain along with the rash. ? Watch closely for changes in your health, and be sure to contact your doctor if:  ? · Your rash is changing or getting worse. For example, call if you have pain along with the rash, the rash is spreading, or you have new blisters. ? · You do not get better after 1 week. Where can you learn more? Go to http://haseeb-maurizio.info/. Enter G183 in the search box to learn more about \"Rash: Care Instructions. \"  Current as of: October 13, 2016  Content Version: 11.4  © 0136-6147 "[employer]. Care instructions adapted under license by [employer] (which disclaims liability or warranty for this information). If you have questions about a medical condition or this instruction, always ask your healthcare professional. [place] [address] any warranty or liability for your use of this information.

## 2018-02-01 LAB
BASOPHILS # BLD AUTO: 0.1 X10E3/UL (ref 0–0.2)
BASOPHILS NFR BLD AUTO: 1 %
CENTROMERE B AB SER-ACNC: <0.2 AI (ref 0–0.9)
CHROMATIN AB SERPL-ACNC: <0.2 AI (ref 0–0.9)
DSDNA AB SER-ACNC: <1 IU/ML (ref 0–9)
ENA JO1 AB SER-ACNC: <0.2 AI (ref 0–0.9)
ENA RNP AB SER-ACNC: 0.2 AI (ref 0–0.9)
ENA SCL70 AB SER-ACNC: <0.2 AI (ref 0–0.9)
ENA SM AB SER-ACNC: <0.2 AI (ref 0–0.9)
ENA SS-A AB SER-ACNC: 0.2 AI (ref 0–0.9)
ENA SS-B AB SER-ACNC: 0.2 AI (ref 0–0.9)
EOSINOPHIL # BLD AUTO: 0.3 X10E3/UL (ref 0–0.4)
EOSINOPHIL NFR BLD AUTO: 4 %
ERYTHROCYTE [DISTWIDTH] IN BLOOD BY AUTOMATED COUNT: 13.2 % (ref 12.3–15.4)
ERYTHROCYTE [SEDIMENTATION RATE] IN BLOOD BY WESTERGREN METHOD: 14 MM/HR (ref 0–32)
HCT VFR BLD AUTO: 38.1 % (ref 34–46.6)
HGB BLD-MCNC: 12.6 G/DL (ref 11.1–15.9)
IMM GRANULOCYTES # BLD: 0 X10E3/UL (ref 0–0.1)
IMM GRANULOCYTES NFR BLD: 0 %
LYMPHOCYTES # BLD AUTO: 2.6 X10E3/UL (ref 0.7–3.1)
LYMPHOCYTES NFR BLD AUTO: 39 %
MCH RBC QN AUTO: 30.8 PG (ref 26.6–33)
MCHC RBC AUTO-ENTMCNC: 33.1 G/DL (ref 31.5–35.7)
MCV RBC AUTO: 93 FL (ref 79–97)
MONOCYTES # BLD AUTO: 0.5 X10E3/UL (ref 0.1–0.9)
MONOCYTES NFR BLD AUTO: 7 %
NEUTROPHILS # BLD AUTO: 3.3 X10E3/UL (ref 1.4–7)
NEUTROPHILS NFR BLD AUTO: 49 %
PLATELET # BLD AUTO: 398 X10E3/UL (ref 150–379)
RBC # BLD AUTO: 4.09 X10E6/UL (ref 3.77–5.28)
RHEUMATOID FACT SERPL-ACNC: <10 IU/ML (ref 0–13.9)
SEE BELOW, 164869: NORMAL
WBC # BLD AUTO: 6.7 X10E3/UL (ref 3.4–10.8)

## 2018-02-01 NOTE — PROGRESS NOTES
Please inform patient that her screening for RA was negative,her CBC was at baseline with a normally slightly elevated platelets count, ask patient if the treatment with steroid cream is helping, recommend f/u with PCP.

## 2018-02-05 ENCOUNTER — TELEPHONE (OUTPATIENT)
Dept: INTERNAL MEDICINE CLINIC | Age: 33
End: 2018-02-05

## 2018-02-05 DIAGNOSIS — F42.9 OBSESSIVE-COMPULSIVE DISORDER, UNSPECIFIED TYPE: Primary | ICD-10-CM

## 2018-02-05 NOTE — TELEPHONE ENCOUNTER
Notes Recorded by Chloe Neff NP on 2/1/2018 at 1:57 PM  Please inform patient that her screening for RA was negative,her CBC was at baseline with a normally slightly elevated platelets count, ask patient if the treatment with steroid cream is helping, recommend f/u with PCP.

## 2018-02-05 NOTE — TELEPHONE ENCOUNTER
Called pt 2 pt identifiers confirmed informed pt of STEVE Lord's notes she stated understanding and that the steroid cream is helping redness however swelling remains. Pt also requesting to be put back on the Zoloft, stated understanding and informed pt that she may need to be seen before resuming this medication. Pt stated understanding no other questions or concerns noted at this time. Please advise in regards to cream and med. Requested Prescriptions     Pending Prescriptions Disp Refills    sertraline (ZOLOFT) 25 mg tablet       Sig: Take 1 Tab by mouth daily.

## 2018-02-05 NOTE — TELEPHONE ENCOUNTER
Incoming call from pt 2 pt identifiers confirmed pt called in regards to medication request, that she is trying to see if she can get the medication since it is one that she was previously on and then f/u with Dr Lisa Coughlin in 2 weeks as ordered. Informed pt that requst has been forwarded to STEVE Lord and waiting on response however he may require appt, to discuss her sx and why she needs themed pt stated understanding and that she did discuss her sx with him at her appt that she had discussed her anxiety and and excess stress during her appt. Stated understanding and informed pt will check with STEVE Lundberg in the morning as he is gone for the day and will give her a call with his response pt stated understanding no other questions or concerns noted at this time.

## 2018-02-06 RX ORDER — SERTRALINE HYDROCHLORIDE 25 MG/1
25 TABLET, FILM COATED ORAL DAILY
Qty: 14 TAB | Refills: 0 | Status: SHIPPED | OUTPATIENT
Start: 2018-02-06 | End: 2018-03-27

## 2018-02-06 NOTE — TELEPHONE ENCOUNTER
Pt is scheduled to f/u with Dr Orin Danielle on 2/14/18 for med eval and f/u for swelling in hands. Requested Prescriptions     Pending Prescriptions Disp Refills    sertraline (ZOLOFT) 25 mg tablet 14 Tab 0     Sig: Take 1 Tab by mouth daily.

## 2018-02-06 NOTE — TELEPHONE ENCOUNTER
Sent electronically:    Requested Prescriptions     Signed Prescriptions Disp Refills    sertraline (ZOLOFT) 25 mg tablet 14 Tab 0     Sig: Take 1 Tab by mouth daily. Authorizing Provider: Eric Prieto     Only 2 week supply until she can see me.

## 2018-02-07 NOTE — TELEPHONE ENCOUNTER
Unsuccessful attempt to reach pt for results. Left message that the med was refilled for 2 weeks only until she f/u c OV and that  She can call back if she has any questions/concerns.

## 2018-03-27 ENCOUNTER — OFFICE VISIT (OUTPATIENT)
Dept: INTERNAL MEDICINE CLINIC | Age: 33
End: 2018-03-27

## 2018-03-27 VITALS
RESPIRATION RATE: 18 BRPM | HEART RATE: 108 BPM | HEIGHT: 67 IN | BODY MASS INDEX: 31.48 KG/M2 | OXYGEN SATURATION: 95 % | DIASTOLIC BLOOD PRESSURE: 71 MMHG | TEMPERATURE: 99.4 F | SYSTOLIC BLOOD PRESSURE: 125 MMHG | WEIGHT: 200.6 LBS

## 2018-03-27 DIAGNOSIS — R68.89 FLU-LIKE SYMPTOMS: Primary | ICD-10-CM

## 2018-03-27 DIAGNOSIS — R11.2 NAUSEA AND VOMITING, INTRACTABILITY OF VOMITING NOT SPECIFIED, UNSPECIFIED VOMITING TYPE: ICD-10-CM

## 2018-03-27 LAB
QUICKVUE INFLUENZA TEST: NEGATIVE
VALID INTERNAL CONTROL?: YES

## 2018-03-27 RX ORDER — PROMETHAZINE HYDROCHLORIDE 25 MG/1
25 TABLET ORAL
Qty: 15 TAB | Refills: 0 | Status: SHIPPED | OUTPATIENT
Start: 2018-03-27 | End: 2018-11-10

## 2018-03-27 RX ORDER — OSELTAMIVIR PHOSPHATE 75 MG/1
75 CAPSULE ORAL 2 TIMES DAILY
Qty: 10 CAP | Refills: 0 | Status: SHIPPED | OUTPATIENT
Start: 2018-03-27 | End: 2018-04-01

## 2018-03-27 RX ORDER — BENZONATATE 100 MG/1
100 CAPSULE ORAL
Qty: 15 CAP | Refills: 0 | Status: SHIPPED | OUTPATIENT
Start: 2018-03-27 | End: 2018-11-10

## 2018-03-27 NOTE — PATIENT INSTRUCTIONS
1) follow-up as needed or sooner if worsening symptoms. 2) Keep hydrated and eat. 3) Don't take phenergan while driving or operating heavy machinery. Influenza (Flu): Care Instructions  Your Care Instructions    Influenza (flu) is an infection in the lungs and breathing passages. It is caused by the influenza virus. There are different strains, or types, of the flu virus from year to year. Unlike the common cold, the flu comes on suddenly and the symptoms, such as a cough, congestion, fever, chills, fatigue, aches, and pains, are more severe. These symptoms may last up to 10 days. Although the flu can make you feel very sick, it usually doesn't cause serious health problems. Home treatment is usually all you need for flu symptoms. But your doctor may prescribe antiviral medicine to prevent other health problems, such as pneumonia, from developing. Older people and those who have a long-term health condition, such as lung disease, are most at risk for having pneumonia or other health problems. Follow-up care is a key part of your treatment and safety. Be sure to make and go to all appointments, and call your doctor if you are having problems. It's also a good idea to know your test results and keep a list of the medicines you take. How can you care for yourself at home? · Get plenty of rest.  · Drink plenty of fluids, enough so that your urine is light yellow or clear like water. If you have kidney, heart, or liver disease and have to limit fluids, talk with your doctor before you increase the amount of fluids you drink. · Take an over-the-counter pain medicine if needed, such as acetaminophen (Tylenol), ibuprofen (Advil, Motrin), or naproxen (Aleve), to relieve fever, headache, and muscle aches. Read and follow all instructions on the label. No one younger than 20 should take aspirin. It has been linked to Reye syndrome, a serious illness. · Do not smoke. Smoking can make the flu worse.  If you need help quitting, talk to your doctor about stop-smoking programs and medicines. These can increase your chances of quitting for good. · Breathe moist air from a hot shower or from a sink filled with hot water to help clear a stuffy nose. · Before you use cough and cold medicines, check the label. These medicines may not be safe for young children or for people with certain health problems. · If the skin around your nose and lips becomes sore, put some petroleum jelly on the area. · To ease coughing:  ¨ Drink fluids to soothe a scratchy throat. ¨ Suck on cough drops or plain hard candy. ¨ Take an over-the-counter cough medicine that contains dextromethorphan to help you get some sleep. Read and follow all instructions on the label. ¨ Raise your head at night with an extra pillow. This may help you rest if coughing keeps you awake. · Take any prescribed medicine exactly as directed. Call your doctor if you think you are having a problem with your medicine. To avoid spreading the flu  · Wash your hands regularly, and keep your hands away from your face. · Stay home from school, work, and other public places until you are feeling better and your fever has been gone for at least 24 hours. The fever needs to have gone away on its own without the help of medicine. · Ask people living with you to talk to their doctors about preventing the flu. They may get antiviral medicine to keep from getting the flu from you. · To prevent the flu in the future, get a flu vaccine every fall. Encourage people living with you to get the vaccine. · Cover your mouth when you cough or sneeze. When should you call for help? Call 911 anytime you think you may need emergency care. For example, call if:  ? · You have severe trouble breathing. ?Call your doctor now or seek immediate medical care if:  ? · You have new or worse trouble breathing. ? · You seem to be getting much sicker. ? · You feel very sleepy or confused. ? · You have a new or higher fever. ? · You get a new rash. ? Watch closely for changes in your health, and be sure to contact your doctor if:  ? · You begin to get better and then get worse. ? · You are not getting better after 1 week. Where can you learn more? Go to http://haseeb-maurizio.info/. Enter L906 in the search box to learn more about \"Influenza (Flu): Care Instructions. \"  Current as of: May 12, 2017  Content Version: 11.4  © 6104-8348 [employer]. Care instructions adapted under license by [employer] (which disclaims liability or warranty for this information). If you have questions about a medical condition or this instruction, always ask your healthcare professional. [address] any warranty or liability for your use of this information.

## 2018-03-27 NOTE — MR AVS SNAPSHOT
[facility] 
 
 
 [address] [id number] 
[phone] Patient: Jamison Kelley MRN: [id number] :1985 Visit Information Date & Time Provider Department Dept. Phone Encounter #  
 3/27/2018  4:45 PM Estrellita Gong MD Elida Woo 139 [id number] Follow-up Instructions Return if symptoms worsen or fail to improve. Your Appointments 3/27/2018  4:45 PM  
SAME DAY with Estrellita Gong MD  
[address] [place] [address] ([address]) Appt Note: flu like symptoms [address]  
  
   
 [address] [facility] Upcoming Health Maintenance Date Due  
 PAP AKA CERVICAL CYTOLOGY 2016 DTaP/Tdap/Td series (1 - Tdap) 4/10/2018* Influenza Age 5 to Adult 2018* *Topic was postponed. The date shown is not the original due date. Allergies as of 3/27/2018  Review Complete On: 3/27/2018 By: Adriana Murphy MD  
 No Known Allergies Current Immunizations  Never Reviewed Name Date Influenza Vaccine (Quad) PF 10/21/2015 Not reviewed this visit You Were Diagnosed With   
  
 Codes Comments Flu-like symptoms    -  Primary ICD-10-CM: R68.89 ICD-9-CM: 780.99 Nausea and vomiting, intractability of vomiting not specified, unspecified vomiting type     ICD-10-CM: R11.2 ICD-9-CM: 787.01 Vitals BP Pulse Temp Resp Height(growth percentile) Weight(growth percentile) 125/71 (BP 1 Location: Left arm, BP Patient Position: Sitting) (!) 108 99.4 °F (37.4 °C) (Oral) 18 5' 7\" (1.702 m) 200 lb 9.6 oz (91 kg) SpO2 BMI OB Status Smoking Status 95% 31.42 kg/m2 IUD Never Smoker Vitals History BMI and BSA Data Body Mass Index Body Surface Area  
 31.42 kg/m 2 2.07 m 2 Preferred Pharmacy Pharmacy Name Phone [address] [place] [address] [phone] Your Updated Medication List  
  
   
This list is accurate as of 3/27/18  3:02 PM.  Always use your most recent med list.  
  
  
  
  
 benzonatate 100 mg capsule Commonly known as:  TESSALON Take 1 Cap by mouth three (3) times daily as needed for Cough. chlorhexidine 0.12 % solution Commonly known as:  PERIDEX RINSE WITH 1/2 OUNCES TWICE A DAY AFTER BRUSHING AND FLOSSING  
  
 hydrocortisone 2.5 % lotion Commonly known as:  HYTONE Apply  to affected area two (2) times a day. use thin layer MIRENA 20 mcg/24 hr (5 years) Iud  
Generic drug:  levonorgestrel 1 Each by IntraUTERine route once. oseltamivir 75 mg capsule Commonly known as:  TAMIFLU Take 1 Cap by mouth two (2) times a day for 5 days. promethazine 25 mg tablet Commonly known as:  PHENERGAN Take 1 Tab by mouth every eight (8) hours as needed for Nausea (and vomiting). Prescriptions Sent to Pharmacy Refills  
 oseltamivir (TAMIFLU) 75 mg capsule 0 Sig: Take 1 Cap by mouth two (2) times a day for 5 days. Class: Normal  
 Pharmacy: [facility] [address], [address] [place] [address]. Ph #: [phone] Route: Oral  
 benzonatate (TESSALON) 100 mg capsule 0 Sig: Take 1 Cap by mouth three (3) times daily as needed for Cough. Class: Normal  
 Pharmacy: [facility] [address], [address] [place] [address]. Ph #: [phone] Route: Oral  
 promethazine (PHENERGAN) 25 mg tablet 0 Sig: Take 1 Tab by mouth every eight (8) hours as needed for Nausea (and vomiting). Class: Normal  
 Pharmacy: [facility] [address], [address] [place] [address]. Ph #: [phone] Route: Oral  
  
We Performed the Following AMB POC RAPID INFLUENZA TEST [06642 CPT(R)] Follow-up Instructions Return if symptoms worsen or fail to improve. Patient Instructions 1) follow-up as needed or sooner if worsening symptoms. 2) Keep hydrated and eat. 3) Don't take phenergan while driving or operating heavy machinery. Influenza (Flu): Care Instructions Your Care Instructions Influenza (flu) is an infection in the lungs and breathing passages. It is caused by the influenza virus. There are different strains, or types, of the flu virus from year to year. Unlike the common cold, the flu comes on suddenly and the symptoms, such as a cough, congestion, fever, chills, fatigue, aches, and pains, are more severe. These symptoms may last up to 10 days. Although the flu can make you feel very sick, it usually doesn't cause serious health problems. Home treatment is usually all you need for flu symptoms. But your doctor may prescribe antiviral medicine to prevent other health problems, such as pneumonia, from developing. Older people and those who have a long-term health condition, such as lung disease, are most at risk for having pneumonia or other health problems. Follow-up care is a key part of your treatment and safety. Be sure to make and go to all appointments, and call your doctor if you are having problems. It's also a good idea to know your test results and keep a list of the medicines you take. How can you care for yourself at home? · Get plenty of rest. 
· Drink plenty of fluids, enough so that your urine is light yellow or clear like water. If you have kidney, heart, or liver disease and have to limit fluids, talk with your doctor before you increase the amount of fluids you drink. · Take an over-the-counter pain medicine if needed, such as acetaminophen (Tylenol), ibuprofen (Advil, Motrin), or naproxen (Aleve), to relieve fever, headache, and muscle aches. Read and follow all instructions on the label. No one younger than 20 should take aspirin. It has been linked to Reye syndrome, a serious illness. · Do not smoke. Smoking can make the flu worse. If you need help quitting, talk to your doctor about stop-smoking programs and medicines. These can increase your chances of quitting for good. · Breathe moist air from a hot shower or from a sink filled with hot water to help clear a stuffy nose. · Before you use cough and cold medicines, check the label. These medicines may not be safe for young children or for people with certain health problems. · If the skin around your nose and lips becomes sore, put some petroleum jelly on the area. · To ease coughing: ¨ Drink fluids to soothe a scratchy throat. ¨ Suck on cough drops or plain hard candy. ¨ Take an over-the-counter cough medicine that contains dextromethorphan to help you get some sleep. Read and follow all instructions on the label. ¨ Raise your head at night with an extra pillow. This may help you rest if coughing keeps you awake. · Take any prescribed medicine exactly as directed. Call your doctor if you think you are having a problem with your medicine. To avoid spreading the flu · Wash your hands regularly, and keep your hands away from your face. · Stay home from school, work, and other public places until you are feeling better and your fever has been gone for at least 24 hours. The fever needs to have gone away on its own without the help of medicine. · Ask people living with you to talk to their doctors about preventing the flu. They may get antiviral medicine to keep from getting the flu from you. · To prevent the flu in the future, get a flu vaccine every fall. Encourage people living with you to get the vaccine. · Cover your mouth when you cough or sneeze. When should you call for help? Call 911 anytime you think you may need emergency care. For example, call if: 
? · You have severe trouble breathing. ?Call your doctor now or seek immediate medical care if: 
? · You have new or worse trouble breathing. ? · You seem to be getting much sicker. ? · You feel very sleepy or confused. ? · You have a new or higher fever. ? · You get a new rash. ? Watch closely for changes in your health, and be sure to contact your doctor if: 
? · You begin to get better and then get worse. ? · You are not getting better after 1 week. Where can you learn more? Go to http://haseeb-maurizio.info/. Enter X293 in the search box to learn more about \"Influenza (Flu): Care Instructions. \" Current as of: May 12, 2017 Content Version: 11.4 © 7033-7274 [employer]. Care instructions adapted under license by [employer] (which disclaims liability or warranty for this information). If you have questions about a medical condition or this instruction, always ask your healthcare professional. [address] any warranty or liability for your use of this information. Introducing [facility]! Dear Shanelle Walker: 
Thank you for requesting a TweetDeck account. Our records indicate that you already have an active TweetDeck account. You can access your account anytime at [email or web address] Did you know that you can access your hospital and ER discharge instructions at any time in TweetDeck? You can also review all of your test results from your hospital stay or ER visit. Additional Information If you have questions, please visit the Frequently Asked Questions section of the TweetDeck website at [email or web address] Remember, TweetDeck is NOT to be used for urgent needs. For medical emergencies, dial 911. Now available from your iPhone and Android! Please provide this summary of care documentation to your next provider. Your primary care clinician is listed as Estrellita Estrella. If you have any questions after today's visit, please call [phone].

## 2018-03-27 NOTE — PROGRESS NOTES
Chief Complaint   Patient presents with    Flu     flu like s/s, afebrile       HPI:     Syed Carreno is a 34 y.o.  female with history of OCD   here for the above complaint. She had a sore throat and runny nose for 2 weeks. It was clear nasal drainage. She was in [place] for 1 week and just got back. Sore throat gone. Yesterday was dry cough. She threw up this afternoon after coughing. She has chills and night sweats, but no fever. She is having body aches. She has shortness of breath, but no chest pain, abdominal pain, headaches or dizziness. She took cold medicine 1 hour ago. Past Medical History:   Diagnosis Date    Abnormal pap 2011    history of this, s/p colpolscopy x 2    Genital HSV 6.2013    pn prophylaxis acyclovir    H/O seasonal allergies     OCD (obsessive compulsive disorder)     Small fiber neuropathy     Vitamin D deficiency      Past Surgical History:   Procedure Laterality Date    HX GYN  2011    s/p colpolscopy x 2    HX UROLOGICAL  2001    dilation of urethra b/c freq UTI's    HX WISDOM TEETH EXTRACTION  2010    only had 3 removed     Current Outpatient Prescriptions   Medication Sig    oseltamivir (TAMIFLU) 75 mg capsule Take 1 Cap by mouth two (2) times a day for 5 days.  benzonatate (TESSALON) 100 mg capsule Take 1 Cap by mouth three (3) times daily as needed for Cough.  promethazine (PHENERGAN) 25 mg tablet Take 1 Tab by mouth every eight (8) hours as needed for Nausea (and vomiting).  hydrocortisone (HYTONE) 2.5 % lotion Apply  to affected area two (2) times a day. use thin layer    chlorhexidine (PERIDEX) 0.12 % solution RINSE WITH 1/2 OUNCES TWICE A DAY AFTER BRUSHING AND FLOSSING    levonorgestrel (MIRENA) 20 mcg/24 hr (5 years) IUD 1 Each by IntraUTERine route once. No current facility-administered medications for this visit.       Health Maintenance   Topic Date Due    PAP AKA CERVICAL CYTOLOGY  07/26/2016    DTaP/Tdap/Td series (1 - Tdap) 04/10/2018 (Originally 2005)    Influenza Age 5 to Adult  2018 (Originally 2017)     Immunization History   Administered Date(s) Administered    Influenza Vaccine (Quad) PF 10/21/2015     No LMP recorded. Patient is not currently having periods (Reason: IUD). Allergies and Intolerances:   No Known Allergies    Family History:   Family History   Problem Relation Age of Onset    Diabetes Mother     Thyroid Disease Mother      hyperthryoidism    Liver Disease Mother      non alcoholic cirrhosis    Diabetes Maternal Grandfather     Heart Disease Maternal Grandfather      MI at young age in 's   [facility] Heart Attack Maternal Grandfather     Hypertension Maternal Grandfather     Coronary Artery Disease Maternal Grandfather     Hypertension Maternal Grandmother     Arthritis-osteo Father     Hypertension Paternal Grandmother     Cancer Paternal Grandfather      bone cancer,  of this in his 63's    Stroke Paternal Aunt        Social History:   She  reports that she has never smoked. She has never used smokeless tobacco.  She  reports that she drinks alcohol. ·     OBJECTIVE:   Physical exam:   Visit Vitals    /71 (BP 1 Location: Left arm, BP Patient Position: Sitting)    Pulse (!) 108    Temp 99.4 °F (37.4 °C) (Oral)    Resp 18    Ht 5' 7\" (1.702 m)    Wt 200 lb 9.6 oz (91 kg)    SpO2 95%    BMI 31.42 kg/m2        Generally: Pleasant female in no acute distress, but ill appearing. HEENT Exam: Head: atraumatic, acephalic                Eyes: PERRLA     Ears: bilaterally normal TM, no erythema or exudate, normal light reflex    Nares: mucosal membranes moist, no erythema    Mouth: Clear, no erythema or exudate    Neck: supple, positive for LAD    Cardiac Exam: regular, rate, and rhythm. Normal S1 and S2.  No murmurs, gallops, or rubs  Pulmonary exam: Clear to auscultation bilaterally      LABS/RADIOLOGICAL TESTS:  Flu swab negative for A & B  All lab results were reviewed and discussed with the patient. ASSESSMENT/PLAN:    1. Flu-like symptoms  -     AMB POC RAPID INFLUENZA TEST  -     oseltamivir (TAMIFLU) 75 mg capsule; Take 1 Cap by mouth two (2) times a day for 5 days. -     benzonatate (TESSALON) 100 mg capsule; Take 1 Cap by mouth three (3) times daily as needed for Cough. 2. Nausea and vomiting, intractability of vomiting not specified, unspecified vomiting type: she was told not to take the phenergan while driving or operating heavy machinery. -     promethazine (PHENERGAN) 25 mg tablet; Take 1 Tab by mouth every eight (8) hours as needed for Nausea (and vomiting). 3.   Requested Prescriptions     Signed Prescriptions Disp Refills    oseltamivir (TAMIFLU) 75 mg capsule 10 Cap 0     Sig: Take 1 Cap by mouth two (2) times a day for 5 days.  benzonatate (TESSALON) 100 mg capsule 15 Cap 0     Sig: Take 1 Cap by mouth three (3) times daily as needed for Cough.  promethazine (PHENERGAN) 25 mg tablet 15 Tab 0     Sig: Take 1 Tab by mouth every eight (8) hours as needed for Nausea (and vomiting). 4. Patient verbalized understanding and agreement with the plan. 5. Patient was given an after-visit summary. 6. Follow-up Disposition:  Return if symptoms worsen or fail to improve. or sooner if worsening symptoms.           Rosemarie Lawton M.D.

## 2018-03-27 NOTE — PROGRESS NOTES
ROOM # 2    Chelsie Jeffers presents today for   Chief Complaint   Patient presents with    Flu     flu like s/s, afebrile       Jagruti Dubonnet Emmy Sensor preferred language for health care discussion is english/other. Is someone accompanying this pt? no    Is the patient using any DME equipment during OV? no    Depression Screening:  PHQ over the last two weeks 3/27/2018 10/21/2015 3/11/2014   Little interest or pleasure in doing things Not at all Not at all Not at all   Feeling down, depressed or hopeless Not at all Not at all Not at all   Total Score PHQ 2 0 0 0       Learning Assessment:  Learning Assessment 3/11/2014   PRIMARY LEARNER Patient   PRIMARY LANGUAGE ENGLISH   LEARNER PREFERENCE PRIMARY LISTENING   ANSWERED BY patient   RELATIONSHIP SELF       Abuse Screening:  No flowsheet data found. Fall Risk  No flowsheet data found. Health Maintenance reviewed and discussed per provider. Yes    Chelsie Jeffers is due for pap smear (pt. To schedule aptmt). Please order/place referral if appropriate. Advance Directive:  1. Do you have an advance directive in place? Patient Reply: no    2. If not, would you like material regarding how to put one in place? Patient Reply: no    Coordination of Care:  1. Have you been to the ER, urgent care clinic since your last visit? Hospitalized since your last visit? no    2. Have you seen or consulted any other health care providers outside of the [facility] since your last visit? Include any pap smears or colon screening.  no

## 2018-06-18 ENCOUNTER — TELEPHONE (OUTPATIENT)
Dept: OBGYN CLINIC | Age: 33
End: 2018-06-18

## 2018-06-18 NOTE — TELEPHONE ENCOUNTER
patient has appt for annual today at 1pm. Her car broke down. She is waiting on [employer]. Will not make today's appt. Want to know if she can be worked in this week b/c? she's waiting a long time for this appt.

## 2018-11-09 ENCOUNTER — TELEPHONE (OUTPATIENT)
Dept: INTERNAL MEDICINE CLINIC | Age: 33
End: 2018-11-09

## 2018-11-09 DIAGNOSIS — Z00.00 LABORATORY TESTS ORDERED AS PART OF A COMPLETE PHYSICAL EXAM (CPE): Primary | ICD-10-CM

## 2018-11-09 DIAGNOSIS — Z00.00 ROUTINE GENERAL MEDICAL EXAMINATION AT A HEALTH CARE FACILITY: ICD-10-CM

## 2018-11-09 DIAGNOSIS — Z13.1 DIABETES MELLITUS SCREENING: ICD-10-CM

## 2018-11-09 DIAGNOSIS — E55.9 VITAMIN D DEFICIENCY: ICD-10-CM

## 2018-11-09 DIAGNOSIS — Z13.29 THYROID DISORDER SCREENING: ICD-10-CM

## 2018-11-09 NOTE — TELEPHONE ENCOUNTER
Patient has upcoming physical appointment she would like to have her labs drawn before her appointment please advise

## 2018-11-10 ENCOUNTER — OFFICE VISIT (OUTPATIENT)
Dept: INTERNAL MEDICINE CLINIC | Age: 33
End: 2018-11-10

## 2018-11-10 VITALS
RESPIRATION RATE: 14 BRPM | OXYGEN SATURATION: 96 % | HEIGHT: 67 IN | SYSTOLIC BLOOD PRESSURE: 130 MMHG | BODY MASS INDEX: 29.22 KG/M2 | TEMPERATURE: 98.6 F | HEART RATE: 86 BPM | WEIGHT: 186.2 LBS | DIASTOLIC BLOOD PRESSURE: 80 MMHG

## 2018-11-10 DIAGNOSIS — L29.9 PRURITUS: Primary | ICD-10-CM

## 2018-11-10 RX ORDER — HYDROXYZINE PAMOATE 25 MG/1
25 CAPSULE ORAL
Qty: 30 CAP | Refills: 0 | Status: SHIPPED | OUTPATIENT
Start: 2018-11-10 | End: 2018-11-24

## 2018-11-10 NOTE — PROGRESS NOTES
HISTORY OF PRESENT ILLNESS Dodie Johnson is a 28 y.o. female. 29 yo female with c/o itching x 3 weeks . She notes initially this was intermittent but has become more intense which prompted visit today. Itching is generalized but spares palms and soles of feet. She denies noticing rash. Denies new products or medications. She has tried switching to scent-free moisturizer without change in sx. Has not noticed excessively dry skin. No home contacts with similar sx. Denies fevers, chills. . Has lost 14 lbs since last visit in [facility] She has taken benadryl with minimal relief. Tried topical hydrocortisone today which helped some. Skin Problem Pertinent negatives include no chest pain, no headaches and no shortness of breath. Review of Systems Constitutional: Negative for chills and fever. HENT: Negative for congestion and ear pain. Eyes: Negative for blurred vision and pain. Respiratory: Negative for cough (had recent URI ~ 1 week ago but sx resolved) and shortness of breath. Cardiovascular: Negative for chest pain, palpitations and leg swelling. Gastrointestinal: Negative for nausea and vomiting. Genitourinary: Negative for frequency and urgency. Musculoskeletal: Negative for joint pain and myalgias. Skin: Positive for itching. Negative for rash. Neurological: Negative for dizziness, tingling and headaches. Psychiatric/Behavioral: Negative for depression. The patient is not nervous/anxious. Past Medical History:  
Diagnosis Date  Abnormal pap 2011  
 history of this, s/p colpolscopy x 2  
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  
 pn prophylaxis acyclovir  H/O seasonal allergies  OCD (obsessive compulsive disorder)  Small fiber neuropathy  Vitamin D deficiency No current outpatient medications on file prior to visit. No current facility-administered medications on file prior to visit.    
 
Physical Exam  
 Constitutional: She appears well-developed and well-nourished. No distress. /80 (BP 1 Location: Right arm, BP Patient Position: Sitting)   Pulse 86   Temp 98.6 °F (37 °C) (Oral)   Resp 14   Ht 5' 7\" (1.702 m)   Wt 186 lb 3.2 oz (84.5 kg)   LMP 11/10/2018 (Exact Date)   SpO2 96%   BMI 29.16 kg/m² Eyes: EOM are normal. Right eye exhibits no discharge. Left eye exhibits no discharge. No scleral icterus. Neck: Neck supple. Cardiovascular: Normal rate, regular rhythm and normal heart sounds. Exam reveals no gallop and no friction rub. No murmur heard. Pulmonary/Chest: Effort normal and breath sounds normal. No respiratory distress. She has no wheezes. She has no rales. Musculoskeletal: She exhibits no edema or tenderness. Lymphadenopathy:  
  She has no cervical adenopathy. Neurological: She is alert. She exhibits normal muscle tone. Skin: Skin is warm and dry. No rash noted. No erythema. Psychiatric: She has a normal mood and affect. Lab Results Component Value Date/Time Sodium 139 08/14/2017 07:47 AM  
 Potassium 4.8 08/14/2017 07:47 AM  
 Chloride 106 08/14/2017 07:47 AM  
 CO2 24 08/14/2017 07:47 AM  
 Anion gap 9 08/14/2017 07:47 AM  
 Glucose 80 08/14/2017 07:47 AM  
 BUN 8 08/14/2017 07:47 AM  
 Creatinine 0.60 08/14/2017 07:47 AM  
 BUN/Creatinine ratio 13 08/14/2017 07:47 AM  
 GFR est AA >60 08/14/2017 07:47 AM  
 GFR est non-AA >60 08/14/2017 07:47 AM  
 Calcium 8.7 08/14/2017 07:47 AM  
 Bilirubin, total 0.5 08/14/2017 07:47 AM  
 AST (SGOT) 11 (L) 08/14/2017 07:47 AM  
 Alk. phosphatase 62 08/14/2017 07:47 AM  
 Protein, total 6.8 08/14/2017 07:47 AM  
 Albumin 3.8 08/14/2017 07:47 AM  
 Globulin 3.0 08/14/2017 07:47 AM  
 A-G Ratio 1.3 08/14/2017 07:47 AM  
 ALT (SGPT) 17 08/14/2017 07:47 AM  
 
Lab Results Component Value Date/Time  WBC 6.7 01/31/2018 12:13 PM  
 HGB 12.6 01/31/2018 12:13 PM  
 HCT 38.1 01/31/2018 12:13 PM  
 PLATELET 232 (H) 69/02/3094 12:13 PM  
 MCV 93 01/31/2018 12:13 PM  
 
Lab Results Component Value Date/Time Cholesterol, total 181 08/14/2017 07:47 AM  
 HDL Cholesterol 89 (H) 08/14/2017 07:47 AM  
 LDL, calculated 82.2 08/14/2017 07:47 AM  
 VLDL, calculated 9.8 08/14/2017 07:47 AM  
 Triglyceride 49 08/14/2017 07:47 AM  
 CHOL/HDL Ratio 2.0 08/14/2017 07:47 AM  
 
ASSESSMENT and PLAN 
  ICD-10-CM ICD-9-CM 1. Pruritus L29.9 698.9 Can hold benadryl. Can try OTC claritin. Atarax ordered if this is not effective. Can also try Sarna. Has appt with PCP Monday and plans to have fasting labs done that morning.

## 2018-11-13 ENCOUNTER — TELEPHONE (OUTPATIENT)
Dept: INTERNAL MEDICINE CLINIC | Age: 33
End: 2018-11-13

## 2018-11-13 NOTE — TELEPHONE ENCOUNTER
Call received from [address] [place] [address], clarification needed on the anti itch lotion.     Pamela Kc - [phone]

## 2018-11-14 ENCOUNTER — HOSPITAL ENCOUNTER (OUTPATIENT)
Dept: LAB | Age: 33
Discharge: HOME | End: 2018-11-14
Payer: COMMERCIAL

## 2018-11-14 DIAGNOSIS — Z13.1 DIABETES MELLITUS SCREENING: ICD-10-CM

## 2018-11-14 DIAGNOSIS — Z13.29 THYROID DISORDER SCREENING: ICD-10-CM

## 2018-11-14 DIAGNOSIS — E55.9 VITAMIN D DEFICIENCY: ICD-10-CM

## 2018-11-14 DIAGNOSIS — Z00.00 ROUTINE GENERAL MEDICAL EXAMINATION AT A HEALTH CARE FACILITY: ICD-10-CM

## 2018-11-14 LAB
ALBUMIN SERPL-MCNC: 4.3 G/DL (ref 3.4–5)
ALBUMIN/GLOB SERPL: 1.5 {RATIO} (ref 0.8–1.7)
ALP SERPL-CCNC: 67 U/L (ref 45–117)
ALT SERPL-CCNC: 61 U/L (ref 13–56)
ANION GAP SERPL CALC-SCNC: 13 MMOL/L (ref 3–18)
APPEARANCE UR: CLEAR
AST SERPL-CCNC: 41 U/L (ref 15–37)
BILIRUB SERPL-MCNC: 0.5 MG/DL (ref 0.2–1)
BILIRUB UR QL: NEGATIVE
BUN SERPL-MCNC: 11 MG/DL (ref 7–18)
BUN/CREAT SERPL: 15 (ref 12–20)
CALCIUM SERPL-MCNC: 8.8 MG/DL (ref 8.5–10.1)
CHLORIDE SERPL-SCNC: 102 MMOL/L (ref 100–108)
CHOLEST SERPL-MCNC: 213 MG/DL
CO2 SERPL-SCNC: 23 MMOL/L (ref 21–32)
COLOR UR: YELLOW
CREAT SERPL-MCNC: 0.75 MG/DL (ref 0.6–1.3)
ERYTHROCYTE [DISTWIDTH] IN BLOOD BY AUTOMATED COUNT: 13.6 % (ref 11.6–14.5)
EST. AVERAGE GLUCOSE BLD GHB EST-MCNC: 105 MG/DL
GLOBULIN SER CALC-MCNC: 2.9 G/DL (ref 2–4)
GLUCOSE SERPL-MCNC: 87 MG/DL (ref 74–99)
GLUCOSE UR STRIP.AUTO-MCNC: NEGATIVE MG/DL
HBA1C MFR BLD: 5.3 % (ref 4.2–5.6)
HCT VFR BLD AUTO: 42.2 % (ref 35–45)
HDLC SERPL-MCNC: 90 MG/DL (ref 40–60)
HDLC SERPL: 2.4 {RATIO} (ref 0–5)
HGB BLD-MCNC: 13.9 G/DL (ref 12–16)
HGB UR QL STRIP: NEGATIVE
KETONES UR QL STRIP.AUTO: ABNORMAL MG/DL
LDLC SERPL CALC-MCNC: 107 MG/DL (ref 0–100)
LEUKOCYTE ESTERASE UR QL STRIP.AUTO: NEGATIVE
LIPID PROFILE,FLP: ABNORMAL
MCH RBC QN AUTO: 32 PG (ref 24–34)
MCHC RBC AUTO-ENTMCNC: 32.9 G/DL (ref 31–37)
MCV RBC AUTO: 97 FL (ref 74–97)
NITRITE UR QL STRIP.AUTO: NEGATIVE
PH UR STRIP: 6.5 [PH] (ref 5–8)
PLATELET # BLD AUTO: 412 K/UL (ref 135–420)
PMV BLD AUTO: 9 FL (ref 9.2–11.8)
POTASSIUM SERPL-SCNC: 4.2 MMOL/L (ref 3.5–5.5)
PROT SERPL-MCNC: 7.2 G/DL (ref 6.4–8.2)
PROT UR STRIP-MCNC: NEGATIVE MG/DL
RBC # BLD AUTO: 4.35 M/UL (ref 4.2–5.3)
SODIUM SERPL-SCNC: 138 MMOL/L (ref 136–145)
SP GR UR REFRACTOMETRY: 1.01 (ref 1–1.03)
TRIGL SERPL-MCNC: 80 MG/DL (ref ?–150)
TSH SERPL DL<=0.05 MIU/L-ACNC: 2.6 UIU/ML (ref 0.36–3.74)
UROBILINOGEN UR QL STRIP.AUTO: 0.2 EU/DL (ref 0.2–1)
VLDLC SERPL CALC-MCNC: 16 MG/DL
WBC # BLD AUTO: 5 K/UL (ref 4.6–13.2)

## 2018-11-14 PROCEDURE — 83036 HEMOGLOBIN GLYCOSYLATED A1C: CPT

## 2018-11-14 PROCEDURE — 84443 ASSAY THYROID STIM HORMONE: CPT

## 2018-11-14 PROCEDURE — 85027 COMPLETE CBC AUTOMATED: CPT

## 2018-11-14 PROCEDURE — 81003 URINALYSIS AUTO W/O SCOPE: CPT

## 2018-11-14 PROCEDURE — 80061 LIPID PANEL: CPT

## 2018-11-14 PROCEDURE — 80053 COMPREHEN METABOLIC PANEL: CPT

## 2018-11-14 PROCEDURE — 36415 COLL VENOUS BLD VENIPUNCTURE: CPT

## 2018-11-14 PROCEDURE — 82306 VITAMIN D 25 HYDROXY: CPT

## 2018-11-15 ENCOUNTER — TELEPHONE (OUTPATIENT)
Dept: INTERNAL MEDICINE CLINIC | Age: 33
End: 2018-11-15

## 2018-11-15 LAB — 25(OH)D3 SERPL-MCNC: 23.7 NG/ML (ref 30–100)

## 2018-11-19 ENCOUNTER — OFFICE VISIT (OUTPATIENT)
Dept: INTERNAL MEDICINE CLINIC | Age: 33
End: 2018-11-19

## 2018-11-19 VITALS
HEIGHT: 67 IN | RESPIRATION RATE: 18 BRPM | TEMPERATURE: 97.4 F | BODY MASS INDEX: 29.22 KG/M2 | SYSTOLIC BLOOD PRESSURE: 125 MMHG | WEIGHT: 186.2 LBS | OXYGEN SATURATION: 99 % | HEART RATE: 91 BPM | DIASTOLIC BLOOD PRESSURE: 73 MMHG

## 2018-11-19 DIAGNOSIS — R79.89 ELEVATED LFTS: ICD-10-CM

## 2018-11-19 DIAGNOSIS — Z23 ENCOUNTER FOR IMMUNIZATION: ICD-10-CM

## 2018-11-19 DIAGNOSIS — Z00.00 ROUTINE GENERAL MEDICAL EXAMINATION AT A HEALTH CARE FACILITY: Primary | ICD-10-CM

## 2018-11-19 DIAGNOSIS — E55.9 VITAMIN D DEFICIENCY: ICD-10-CM

## 2018-11-19 NOTE — PATIENT INSTRUCTIONS
1) Take vitamin D3 2000 international units  Take one po daily over the counter. 2) Follow-up in 1 year or sooner if worsening symptoms. 3) get LFT's in 2-3 weeks.

## 2018-11-19 NOTE — PROGRESS NOTES
Chief Complaint Patient presents with  Complete Physical  
 
 
 
HPI:  
 
Franklin Vazquez is a 28 y.o.  female with history of  OCD and abnormal pap smear who presents for complete physical exam. She went to friend's [employer] party On Monday prior to lab work on Wednesday. They drank 4 bottles of wine at the table. She does not take tylenol. She denies any chest pain, shortness of breath, abdominal pain, headaches or dizziness. Family history of LI in mother and fatty liver in brother. Past Medical History:  
Diagnosis Date  Abnormal pap 2011  
 history of this, s/p colpolscopy x 2  
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  
 pn prophylaxis acyclovir  H/O seasonal allergies  OCD (obsessive compulsive disorder)  Small fiber neuropathy  Vitamin D deficiency 11/2018 Past Surgical History:  
Procedure Laterality Date  HX GYN  2011  
 s/p colpolscopy x 2  
 HX UROLOGICAL  2001  
 dilation of urethra b/c freq UTI's  HX WISDOM TEETH EXTRACTION  2010  
 only had 3 removed MEDICATION ALLERGIES/INTOLERANCES:  
No Known Allergies CURRENT MEDICATIONS: 
 
Current Outpatient Medications Medication Sig  prenatal 42/SNRM fum/folic/dha (PRENATAL-1 PO) Take  by mouth.  hydrOXYzine pamoate (VISTARIL) 25 mg capsule Take 1 Cap by mouth three (3) times daily as needed for Itching for up to 14 days.  camphor-menthol (SARNA) 0.5-0.5 % lotion Apply  to affected area as needed for Itching. No current facility-administered medications for this visit. Health Maintenance Topic Date Due  
 DTaP/Tdap/Td series (1 - Tdap) 07/26/2005  Influenza Age 5 to Adult  08/01/2018  PAP AKA CERVICAL CYTOLOGY  06/27/2021 FAMILY HISTORY:  
Family History Problem Relation Age of Onset  Diabetes Mother  Thyroid Disease Mother   
     hyperthryoidism  Liver Disease Mother   
     non alcoholic cirrhosis  Other Mother   
     fatty liver (LI)  Diabetes Maternal Grandfather  Heart Disease Maternal Grandfather MI at young age in 42's  Heart Attack Maternal Grandfather  Hypertension Maternal Grandfather  Coronary Artery Disease Maternal Grandfather  Hypertension Maternal Grandmother  Arthritis-osteo Father  Hypertension Paternal Grandmother  Cancer Paternal Grandfather   
     bone cancer,  of this in his 63's  Other Brother Fatty liver  Stroke Paternal Aunt SOCIAL HISTORY:  
She  reports that  has never smoked. she has never used smokeless tobacco.  She  reports that she drinks alcohol. HEALTH MAINTENANCE AND SCREENING: 
TDAP and flu shot: due Pap smear: 2018 repeat in 3 years. ROS:  
General: negative for - chills, fatigue, fever, weight loss or weight gain, night sweats HEENT: negative for - no sore throat, nasal congestion, vision problems or ear problems Resp: negative for - cough, shortness of breath or wheezing CV: negative for - chest pain, palpitations, orthopnea or PND, 
GI: negative for - abdominal pain, change in bowel habits, constipation, diarrhea, blood or black tarry stools, or nausea/vomiting : negative for - dysuria, hematuria, incontinence, pelvic pain or vulvar/vaginal symptoms Heme: negative for -excessive bleeding or bruising Endo: negative for - hot flashes, polydipsia/polyuria or hot or cold intolerance MSK: negative for - joint pain, joint swelling or muscle pain Neuro: negative for - numbness, tingling, headache or dizziness Derm: negative for - dry skin, hair changes, rash or skin lesion changes Psych: negative for - anxiety, depression, irritability or mood swings or insomnia OBJECTIVE: 
PHYSICAL EXAM: Vitals:  
Vitals:  
 18 1253 BP: 125/73 Pulse: 91  
Resp: 18 Temp: 97.4 °F (36.3 °C) TempSrc: Oral  
SpO2: 99% Weight: 186 lb 3.2 oz (84.5 kg) Height: 5' 7\" (1.702 m) Generally: Pleasant female in no acute distress HEENT exam: Head: atraumatic Eyes: Pupils equally round and reactive to light, Fundoscopic exam is                       normal 
             Ears: bilaterally: Normal tympanic membrane, no erythema or exudate,                         normal light Reflex Nares: moist mucosa, no erythema Mouth: clear, no erythema or exudate Neck: supple, no lymphadenopathy, negative thyromegaly, negative                                   carotid bruits bilaterally Cardiac exam: regular, rate, and rhythm. No murmurs, gallops, or rubs. Normal S1 and S2. 
 
Pulmonary exam: Clear to ausculation bilaterally Abdominal exam: Positive bowel sounds in all four quadrants, soft, nondistended, nontender. No hepatosplenomegaly. Extremities: 2+ dorsalis pedis bilaterally. No pedal edema bilaterally. Musculoskeletal exam: 5 out of 5 strength in upper and lower extremities bilaterally. Good range of motion in upper and lower extremities. 2 out of 2 reflexes in upper and lower extremities bilaterally. Neurological exam: Cranial nerves II-XII all intact. Normal sensation in upper and lower extremities. Normal gait. Bilateral breast exam: no masses felt, no TTP, no nipple discharge, bilateral axillae normal, no masses felt Skin: benign mole on right arm. LABS/RADIOLOGICAL TESTS:  
Lab Results Component Value Date/Time WBC 5.0 11/14/2018 09:12 AM  
 HGB 13.9 11/14/2018 09:12 AM  
 HCT 42.2 11/14/2018 09:12 AM  
 PLATELET 869 66/35/2687 09:12 AM  
 
Lab Results Component Value Date/Time Sodium 138 11/14/2018 09:12 AM  
 Potassium 4.2 11/14/2018 09:12 AM  
 Chloride 102 11/14/2018 09:12 AM  
 CO2 23 11/14/2018 09:12 AM  
 Glucose 87 11/14/2018 09:12 AM  
 BUN 11 11/14/2018 09:12 AM  
 Creatinine 0.75 11/14/2018 09:12 AM  
 
Lab Results Component Value Date/Time  Cholesterol, total 213 (H) 11/14/2018 09:12 AM  
 HDL Cholesterol 90 (H) 11/14/2018 09:12 AM  
 LDL, calculated 107 (H) 11/14/2018 09:12 AM  
 Triglyceride 80 11/14/2018 09:12 AM  
 
No results found for: GPT All lab results and radiological studies were discussed and reviewed with the patient. ASSESSMENT/PLAN: 
1. Routine general medical examination at a health care facility 2. Elevated LFTs: think due to ETOH. She will recheck levels in 2-3 weeks. -     HEPATIC FUNCTION PANEL; Future 3. Vitamin D deficiency: start vitamin D3 2000 international units  Take one po daily OTC. 4. Encounter for immunization 
-     INFLUENZA VIRUS VAC QUAD,SPLIT,PRESV FREE SYRINGE IM 
-     TETANUS, DIPHTHERIA TOXOIDS AND ACELLULAR PERTUSSIS VACCINE (TDAP), IN INDIVIDS. >=7, IM 
 
5. Patient verbalized understanding and agreement with the plan. 6. Patient was given after visit summary. 7. Follow-up Disposition: 
Return in about 1 year (around 11/19/2019) for CPE. or sooner if worsening symptoms.  
 
 
 
Velma Reynaga MD

## 2018-11-19 NOTE — PROGRESS NOTES
ROOM # 1 Adrian Calderon presents today for Chief Complaint Patient presents with  Complete Physical  
 
 
Michael Sheth preferred language for health care discussion is english/other. Is someone accompanying this pt? no 
 
Is the patient using any DME equipment during OV? no 
 
Depression Screening: PHQ over the last two weeks 11/19/2018 11/10/2018 3/27/2018 10/21/2015 3/11/2014 Little interest or pleasure in doing things Not at all Not at all Not at all Not at all Not at all Feeling down, depressed, irritable, or hopeless Not at all Not at all Not at all Not at all Not at all Total Score PHQ 2 0 0 0 0 0 Learning Assessment: 
Learning Assessment 3/11/2014 PRIMARY LEARNER Patient PRIMARY LANGUAGE ENGLISH  
LEARNER PREFERENCE PRIMARY LISTENING  
ANSWERED BY patient RELATIONSHIP SELF Abuse Screening: 
Abuse Screening Questionnaire 11/19/2018 Do you ever feel afraid of your partner? Sedonia Haagensen Are you in a relationship with someone who physically or mentally threatens you? Sedonia Haagensen Is it safe for you to go home? Zhou Wilson Fall Risk No flowsheet data found. Health Maintenance reviewed and discussed per provider. Yes Adrian Calderon is due for Health Maintenance Due Topic Date Due  
 DTaP/Tdap/Td series (1 - Tdap) 07/26/2005  Influenza Age 5 to Adult  08/01/2018 Please order/place referral if appropriate. Advance Directive: 1. Do you have an advance directive in place? Patient Reply: no 
 
2. If not, would you like material regarding how to put one in place? Patient Reply: no 
 
Coordination of Care: 1. Have you been to the ER, urgent care clinic since your last visit? Hospitalized since your last visit? no 
 
2. Have you seen or consulted any other health care providers outside of the [facility] since your last visit? Include any pap smears or colon screening. no 
 
Immunization History Administered Date(s) Administered  Influenza Vaccine (Quad) PF 10/21/2015, 11/19/2018  Tdap 11/19/2018 Immunization administered by Aravind Galdamez LPN with pt's consent. Patient tolerated procedure well. Pt. Observed for 10 mins. No reactions noted/reported, VIS x2 given.

## 2018-12-08 LAB
ALBUMIN SERPL-MCNC: 4.2 G/DL (ref 3.5–5.5)
ALP SERPL-CCNC: 47 IU/L (ref 39–117)
ALT SERPL-CCNC: 32 IU/L (ref 0–32)
AST SERPL-CCNC: 24 IU/L (ref 0–40)
BILIRUB DIRECT SERPL-MCNC: 0.09 MG/DL (ref 0–0.4)
BILIRUB SERPL-MCNC: 0.3 MG/DL (ref 0–1.2)
PROT SERPL-MCNC: 6.3 G/DL (ref 6–8.5)

## 2018-12-10 DIAGNOSIS — R79.89 ELEVATED LFTS: ICD-10-CM

## 2019-01-21 ENCOUNTER — OFFICE VISIT (OUTPATIENT)
Dept: INTERNAL MEDICINE CLINIC | Age: 34
End: 2019-01-21

## 2019-01-21 VITALS
TEMPERATURE: 97.8 F | WEIGHT: 190 LBS | SYSTOLIC BLOOD PRESSURE: 110 MMHG | BODY MASS INDEX: 29.82 KG/M2 | OXYGEN SATURATION: 99 % | DIASTOLIC BLOOD PRESSURE: 87 MMHG | HEIGHT: 67 IN | HEART RATE: 96 BPM | RESPIRATION RATE: 18 BRPM

## 2019-01-21 DIAGNOSIS — J40 BRONCHITIS: Primary | ICD-10-CM

## 2019-01-21 RX ORDER — ERGOCALCIFEROL 1.25 MG/1
50000 CAPSULE ORAL
COMMUNITY
End: 2019-01-21

## 2019-01-21 RX ORDER — MELATONIN
1000 DAILY
COMMUNITY
End: 2021-11-08

## 2019-01-21 RX ORDER — AZITHROMYCIN 250 MG/1
TABLET, FILM COATED ORAL
Qty: 6 TAB | Refills: 0 | Status: SHIPPED | OUTPATIENT
Start: 2019-01-21 | End: 2019-01-26

## 2019-01-21 NOTE — PATIENT INSTRUCTIONS
1) Follow-up as needed or sooner if worsening symptoms. Bronchitis: Care Instructions  Your Care Instructions    Bronchitis is inflammation of the bronchial tubes, which carry air to the lungs. The tubes swell and produce mucus, or phlegm. The mucus and inflamed bronchial tubes make you cough. You may have trouble breathing. Most cases of bronchitis are caused by viruses like those that cause colds. Antibiotics usually do not help and they may be harmful. Bronchitis usually develops rapidly and lasts about 2 to 3 weeks in otherwise healthy people. Follow-up care is a key part of your treatment and safety. Be sure to make and go to all appointments, and call your doctor if you are having problems. It's also a good idea to know your test results and keep a list of the medicines you take. How can you care for yourself at home? · Take all medicines exactly as prescribed. Call your doctor if you think you are having a problem with your medicine. · Get some extra rest.  · Take an over-the-counter pain medicine, such as acetaminophen (Tylenol), ibuprofen (Advil, Motrin), or naproxen (Aleve) to reduce fever and relieve body aches. Read and follow all instructions on the label. · Do not take two or more pain medicines at the same time unless the doctor told you to. Many pain medicines have acetaminophen, which is Tylenol. Too much acetaminophen (Tylenol) can be harmful. · Take an over-the-counter cough medicine that contains dextromethorphan to help quiet a dry, hacking cough so that you can sleep. Avoid cough medicines that have more than one active ingredient. Read and follow all instructions on the label. · Breathe moist air from a humidifier, hot shower, or sink filled with hot water. The heat and moisture will thin mucus so you can cough it out. · Do not smoke. Smoking can make bronchitis worse. If you need help quitting, talk to your doctor about stop-smoking programs and medicines.  These can increase your chances of quitting for good. When should you call for help? Call 911 anytime you think you may need emergency care. For example, call if:    · You have severe trouble breathing.    Call your doctor now or seek immediate medical care if:    · You have new or worse trouble breathing.     · You cough up dark brown or bloody mucus (sputum).     · You have a new or higher fever.     · You have a new rash.    Watch closely for changes in your health, and be sure to contact your doctor if:    · You cough more deeply or more often, especially if you notice more mucus or a change in the color of your mucus.     · You are not getting better as expected. Where can you learn more? Go to http://haseeb-maurizio.info/. Enter H333 in the search box to learn more about \"Bronchitis: Care Instructions. \"  Current as of: September 5, 2018  Content Version: 11.9  © 3336-0895 [employer]. Care instructions adapted under license by [employer] (which disclaims liability or warranty for this information). If you have questions about a medical condition or this instruction, always ask your healthcare professional. [place] [address] any warranty or liability for your use of this information.

## 2019-01-21 NOTE — PROGRESS NOTES
ROOM # 1    Munira Crespo presents today for   Chief Complaint   Patient presents with    Cough     with green sputm . cold since jan1    Fatigue       Juana Sheth preferred language for health care discussion is english/other. Is someone accompanying this pt? no    Is the patient using any DME equipment during OV? no    Depression Screening:  PHQ over the last two weeks 11/19/2018 11/10/2018 3/27/2018 10/21/2015 3/11/2014   Little interest or pleasure in doing things Not at all Not at all Not at all Not at all Not at all   Feeling down, depressed, irritable, or hopeless Not at all Not at all Not at all Not at all Not at all   Total Score PHQ 2 0 0 0 0 0       Learning Assessment:  Learning Assessment 3/11/2014   PRIMARY LEARNER Patient   PRIMARY LANGUAGE ENGLISH   LEARNER PREFERENCE PRIMARY LISTENING   ANSWERED BY patient   RELATIONSHIP SELF       Abuse Screening:  Abuse Screening Questionnaire 11/19/2018   Do you ever feel afraid of your partner? N   Are you in a relationship with someone who physically or mentally threatens you? N   Is it safe for you to go home? Y       Fall Risk  No flowsheet data found. Health Maintenance reviewed and discussed per provider. Yes    Munira Crespo is due for   Health Maintenance Due   Topic Date Due    MEDICARE YEARLY EXAM  01/17/2019         Please order/place referral if appropriate. Advance Directive:  1. Do you have an advance directive in place? Patient Reply: no    2. If not, would you like material regarding how to put one in place? Patient Reply: no    Coordination of Care:  1. Have you been to the ER, urgent care clinic since your last visit? Hospitalized since your last visit? no    2. Have you seen or consulted any other health care providers outside of the [address] [place] [address] since your last visit? Include any pap smears or colon screening.  no

## 2019-01-21 NOTE — PROGRESS NOTES
Chief Complaint   Patient presents with    Cough     with green sputm . cold since jan1    Fatigue       HPI:     Arley Campbell is a 28 y.o.  female with history of OCD and seasonal allergies  here for the above complaint. She said her symptoms started 1/1/19. She had a cold and then went away. Then other symptoms started with body aches and then recently started having sore throat and chills. Now she has a cough productive of green sputum. The runny nose is gone. She said she is tired. She has chills and nausea. She has post nasal drainage. No fevers, but night sweats. She has chest pain, shortness of breath, dizziness. She said she almost fainted when she got up. No headaches or abdominal pain. She has been taking tussin OTC to help with cough. She has been taking multisystem nondrowsy medication. Pt does not think she has strep throat. Past Medical History:   Diagnosis Date    Abnormal pap 2011    history of this, s/p colpolscopy x 2    Genital HSV 6.2013    pn prophylaxis acyclovir    H/O seasonal allergies     OCD (obsessive compulsive disorder)     Small fiber neuropathy     Vitamin D deficiency 11/2018     Past Surgical History:   Procedure Laterality Date    HX GYN  2011    s/p colpolscopy x 2    HX UROLOGICAL  2001    dilation of urethra b/c freq UTI's    HX WISDOM TEETH EXTRACTION  2010    only had 3 removed     Current Outpatient Medications   Medication Sig    Cetirizine (ZYRTEC) 10 mg cap Take 10 mg by mouth daily.  cholecalciferol (VITAMIN D3) 1,000 unit tablet Take 1,000 Units by mouth daily.  azithromycin (ZITHROMAX) 250 mg tablet Take 2 tablets today, then take 1 tablet daily    prenatal 80/KVIM fum/folic/dha (PRENATAL-1 PO) Take  by mouth. Take one po daily     No current facility-administered medications for this visit.       Health Maintenance   Topic Date Due    MEDICARE YEARLY EXAM  01/17/2019    PAP AKA CERVICAL CYTOLOGY  06/27/2021    DTaP/Tdap/Td series (2 - Td) 2028    Influenza Age 5 to Adult  Completed     Immunization History   Administered Date(s) Administered    Influenza Vaccine (Quad) PF 10/21/2015, 2018    Tdap 2018     No LMP recorded. Patient is not currently having periods (Reason: IUD). Allergies and Intolerances:   No Known Allergies    Family History:   Family History   Problem Relation Age of Onset    Diabetes Mother     Thyroid Disease Mother         hyperthryoidism    Liver Disease Mother         non alcoholic cirrhosis    Other Mother         fatty liver (LI)    Diabetes Maternal Grandfather     Heart Disease Maternal Grandfather         MI at young age in 's   [facility] Heart Attack Maternal Grandfather     Hypertension Maternal Grandfather     Coronary Artery Disease Maternal Grandfather     Hypertension Maternal Grandmother     Arthritis-osteo Father     Hypertension Paternal Grandmother     Cancer Paternal Grandfather         bone cancer,  of this in his 57's    Other Brother         Fatty liver    Stroke Paternal Aunt        Social History:   She  reports that  has never smoked. she has never used smokeless tobacco.  She  reports that she drinks alcohol. ·     OBJECTIVE:   Physical exam:   Visit Vitals  /87 (BP 1 Location: Right arm, BP Patient Position: Sitting)   Pulse 96   Temp 97.8 °F (36.6 °C) (Oral)   Resp 18   Ht 5' 7\" (1.702 m)   Wt 190 lb (86.2 kg)   SpO2 99%   BMI 29.76 kg/m²        Generally: Pleasant female in no acute distress  HEENT Exam: Head: atraumatic, acephalic                Eyes: PERRLA     Ears: bilaterally normal TM, no erythema or exudate, normal light reflex    Nares: mucosal membranes moist, no erythema    Mouth: Clear, no erythema or exudate    Neck: supple, ? LAD on both sides of the neck  Cardiac Exam: regular, rate, and rhythm. Normal S1 and S2.  No murmurs, gallops, or rubs  Pulmonary exam: Clear to auscultation bilaterally    LABS/RADIOLOGICAL TESTS:  Lab Results   Component Value Date/Time    WBC 5.0 11/14/2018 09:12 AM    HGB 13.9 11/14/2018 09:12 AM    HCT 42.2 11/14/2018 09:12 AM    PLATELET 781 36/69/1793 09:12 AM     Lab Results   Component Value Date/Time    Sodium 138 11/14/2018 09:12 AM    Potassium 4.2 11/14/2018 09:12 AM    Chloride 102 11/14/2018 09:12 AM    CO2 23 11/14/2018 09:12 AM    Glucose 87 11/14/2018 09:12 AM    BUN 11 11/14/2018 09:12 AM    Creatinine 0.75 11/14/2018 09:12 AM     Lab Results   Component Value Date/Time    Cholesterol, total 213 (H) 11/14/2018 09:12 AM    HDL Cholesterol 90 (H) 11/14/2018 09:12 AM    LDL, calculated 107 (H) 11/14/2018 09:12 AM    Triglyceride 80 11/14/2018 09:12 AM     No results found for: GPT    Previous labs  ASSESSMENT/PLAN:    1. Bronchitis  -     azithromycin (ZITHROMAX) 250 mg tablet; Take 2 tablets today, then take 1 tablet daily  - she will take tussin OTC. She did not want cough medication. She said her  and her trying to get pregnant. She is currently not pregnant so she will not take the tessalon perles. 2.   Requested Prescriptions     Signed Prescriptions Disp Refills    azithromycin (ZITHROMAX) 250 mg tablet 6 Tab 0     Sig: Take 2 tablets today, then take 1 tablet daily     3. Patient verbalized understanding and agreement with the plan. 4. Patient was given an after-visit summary. 5. Follow-up Disposition:  Return if symptoms worsen or fail to improve. or sooner if worsening symptoms.           Adrien Berman M.D.

## 2019-03-27 LAB
ANTIBODY SCREEN, EXTERNAL: NEGATIVE
CHLAMYDIA, EXTERNAL: NEGATIVE
HBSAG, EXTERNAL: NEGATIVE
HEPATITIS C AB,   EXT: NEGATIVE
HIV, EXTERNAL: NEGATIVE
N. GONORRHEA, EXTERNAL: NEGATIVE
RPR, EXTERNAL: NORMAL
RUBELLA, EXTERNAL: NORMAL
TYPE, ABO & RH, EXTERNAL: NORMAL

## 2019-10-08 LAB — GRBS, EXTERNAL: NEGATIVE

## 2019-11-05 ENCOUNTER — HOSPITAL ENCOUNTER (INPATIENT)
Age: 34
LOS: 1 days | Discharge: HOME | End: 2019-11-06
Attending: OBSTETRICS & GYNECOLOGY | Admitting: OBSTETRICS & GYNECOLOGY
Payer: COMMERCIAL

## 2019-11-05 ENCOUNTER — ANESTHESIA (OUTPATIENT)
Dept: LABOR AND DELIVERY | Age: 34
End: 2019-11-05
Payer: COMMERCIAL

## 2019-11-05 ENCOUNTER — ANESTHESIA EVENT (OUTPATIENT)
Dept: LABOR AND DELIVERY | Age: 34
End: 2019-11-05
Payer: COMMERCIAL

## 2019-11-05 ENCOUNTER — HOSPITAL ENCOUNTER (EMERGENCY)
Age: 34
Discharge: STILL ADMITTED OR ADMINISTRATIVE | End: 2019-11-05
Attending: EMERGENCY MEDICINE
Payer: COMMERCIAL

## 2019-11-05 PROBLEM — O09.523 MULTIGRAVIDA OF ADVANCED MATERNAL AGE IN THIRD TRIMESTER: Status: ACTIVE | Noted: 2019-11-05

## 2019-11-05 PROBLEM — E55.9 VITAMIN D DEFICIENCY: Status: RESOLVED | Noted: 2018-11-01 | Resolved: 2019-11-05

## 2019-11-05 LAB
ABO + RH BLD: NORMAL
BASOPHILS # BLD: 0 K/UL (ref 0–0.1)
BASOPHILS # BLD: 0 K/UL (ref 0–0.1)
BASOPHILS NFR BLD: 0 % (ref 0–2)
BASOPHILS NFR BLD: 0 % (ref 0–2)
BLOOD GROUP ANTIBODIES SERPL: NORMAL
DIFFERENTIAL METHOD BLD: ABNORMAL
DIFFERENTIAL METHOD BLD: ABNORMAL
EOSINOPHIL # BLD: 0 K/UL (ref 0–0.4)
EOSINOPHIL # BLD: 0.1 K/UL (ref 0–0.4)
EOSINOPHIL NFR BLD: 0 % (ref 0–5)
EOSINOPHIL NFR BLD: 1 % (ref 0–5)
ERYTHROCYTE [DISTWIDTH] IN BLOOD BY AUTOMATED COUNT: 13.8 % (ref 11.6–14.5)
ERYTHROCYTE [DISTWIDTH] IN BLOOD BY AUTOMATED COUNT: 14.1 % (ref 11.6–14.5)
HCT VFR BLD AUTO: 33.9 % (ref 35–45)
HCT VFR BLD AUTO: 38.5 % (ref 35–45)
HGB BLD-MCNC: 11.2 G/DL (ref 12–16)
HGB BLD-MCNC: 13.1 G/DL (ref 12–16)
LYMPHOCYTES # BLD: 1.3 K/UL (ref 0.9–3.6)
LYMPHOCYTES # BLD: 3.1 K/UL (ref 0.9–3.6)
LYMPHOCYTES NFR BLD: 20 % (ref 21–52)
LYMPHOCYTES NFR BLD: 6 % (ref 21–52)
MCH RBC QN AUTO: 29.6 PG (ref 24–34)
MCH RBC QN AUTO: 30.2 PG (ref 24–34)
MCHC RBC AUTO-ENTMCNC: 33 G/DL (ref 31–37)
MCHC RBC AUTO-ENTMCNC: 34 G/DL (ref 31–37)
MCV RBC AUTO: 88.7 FL (ref 74–97)
MCV RBC AUTO: 89.7 FL (ref 74–97)
MONOCYTES # BLD: 0.9 K/UL (ref 0.05–1.2)
MONOCYTES # BLD: 0.9 K/UL (ref 0.05–1.2)
MONOCYTES NFR BLD: 4 % (ref 3–10)
MONOCYTES NFR BLD: 5 % (ref 3–10)
NEUTS SEG # BLD: 11.9 K/UL (ref 1.8–8)
NEUTS SEG # BLD: 19.6 K/UL (ref 1.8–8)
NEUTS SEG NFR BLD: 74 % (ref 40–73)
NEUTS SEG NFR BLD: 90 % (ref 40–73)
PLATELET # BLD AUTO: 287 K/UL (ref 135–420)
PLATELET # BLD AUTO: 355 K/UL (ref 135–420)
PMV BLD AUTO: 9.3 FL (ref 9.2–11.8)
PMV BLD AUTO: 9.5 FL (ref 9.2–11.8)
RBC # BLD AUTO: 3.78 M/UL (ref 4.2–5.3)
RBC # BLD AUTO: 4.34 M/UL (ref 4.2–5.3)
SPECIMEN EXP DATE BLD: NORMAL
WBC # BLD AUTO: 16 K/UL (ref 4.6–13.2)
WBC # BLD AUTO: 21.8 K/UL (ref 4.6–13.2)

## 2019-11-05 PROCEDURE — 77030040830 HC CATH URETH FOL MDII -A

## 2019-11-05 PROCEDURE — 76060000078 HC EPIDURAL ANESTHESIA

## 2019-11-05 PROCEDURE — 00HU33Z INSERTION OF INFUSION DEVICE INTO SPINAL CANAL, PERCUTANEOUS APPROACH: ICD-10-PCS | Performed by: NURSE ANESTHETIST, CERTIFIED REGISTERED

## 2019-11-05 PROCEDURE — 74011000250 HC RX REV CODE- 250: Performed by: ADVANCED PRACTICE MIDWIFE

## 2019-11-05 PROCEDURE — 74011000250 HC RX REV CODE- 250: Performed by: NURSE ANESTHETIST, CERTIFIED REGISTERED

## 2019-11-05 PROCEDURE — 77030007879 HC KT SPN EPDRL TELE -B: Performed by: NURSE ANESTHETIST, CERTIFIED REGISTERED

## 2019-11-05 PROCEDURE — 75810000275 HC EMERGENCY DEPT VISIT NO LEVEL OF CARE

## 2019-11-05 PROCEDURE — 85025 COMPLETE CBC W/AUTO DIFF WBC: CPT

## 2019-11-05 PROCEDURE — 0HQ9XZZ REPAIR PERINEUM SKIN, EXTERNAL APPROACH: ICD-10-PCS | Performed by: OBSTETRICS & GYNECOLOGY

## 2019-11-05 PROCEDURE — 75410000002 HC LABOR FEE PER 1 HR

## 2019-11-05 PROCEDURE — 74011250636 HC RX REV CODE- 250/636: Performed by: NURSE ANESTHETIST, CERTIFIED REGISTERED

## 2019-11-05 PROCEDURE — 75410000000 HC DELIVERY VAGINAL/SINGLE

## 2019-11-05 PROCEDURE — 74011250637 HC RX REV CODE- 250/637: Performed by: ADVANCED PRACTICE MIDWIFE

## 2019-11-05 PROCEDURE — 86900 BLOOD TYPING SEROLOGIC ABO: CPT

## 2019-11-05 PROCEDURE — 74011250636 HC RX REV CODE- 250/636: Performed by: ADVANCED PRACTICE MIDWIFE

## 2019-11-05 PROCEDURE — 65270000029 HC RM PRIVATE

## 2019-11-05 PROCEDURE — 74011250636 HC RX REV CODE- 250/636

## 2019-11-05 PROCEDURE — 75410000003 HC RECOV DEL/VAG/CSECN EA 0.5 HR

## 2019-11-05 PROCEDURE — 36415 COLL VENOUS BLD VENIPUNCTURE: CPT

## 2019-11-05 RX ORDER — MISOPROSTOL 200 UG/1
800 TABLET ORAL
Status: COMPLETED | OUTPATIENT
Start: 2019-11-05 | End: 2019-11-05

## 2019-11-05 RX ORDER — ZOLPIDEM TARTRATE 5 MG/1
5 TABLET ORAL
Status: DISCONTINUED | OUTPATIENT
Start: 2019-11-05 | End: 2019-11-06 | Stop reason: HOSPADM

## 2019-11-05 RX ORDER — HYDROMORPHONE HYDROCHLORIDE 1 MG/ML
1 INJECTION, SOLUTION INTRAMUSCULAR; INTRAVENOUS; SUBCUTANEOUS
Status: DISCONTINUED | OUTPATIENT
Start: 2019-11-05 | End: 2019-11-05

## 2019-11-05 RX ORDER — NALOXONE HYDROCHLORIDE 0.4 MG/ML
0.4 INJECTION, SOLUTION INTRAMUSCULAR; INTRAVENOUS; SUBCUTANEOUS AS NEEDED
Status: DISCONTINUED | OUTPATIENT
Start: 2019-11-05 | End: 2019-11-05

## 2019-11-05 RX ORDER — VALACYCLOVIR HYDROCHLORIDE 500 MG/1
500 TABLET, FILM COATED ORAL 2 TIMES DAILY
COMMUNITY
End: 2019-11-06

## 2019-11-05 RX ORDER — BUTORPHANOL TARTRATE 1 MG/ML
2 INJECTION INTRAMUSCULAR; INTRAVENOUS
Status: DISCONTINUED | OUTPATIENT
Start: 2019-11-05 | End: 2019-11-05

## 2019-11-05 RX ORDER — ACETAMINOPHEN 325 MG/1
650 TABLET ORAL
Status: DISCONTINUED | OUTPATIENT
Start: 2019-11-05 | End: 2019-11-06 | Stop reason: HOSPADM

## 2019-11-05 RX ORDER — METHYLERGONOVINE MALEATE 0.2 MG/ML
0.2 INJECTION INTRAVENOUS AS NEEDED
Status: COMPLETED | OUTPATIENT
Start: 2019-11-05 | End: 2019-11-05

## 2019-11-05 RX ORDER — LIDOCAINE HYDROCHLORIDE 10 MG/ML
20 INJECTION, SOLUTION EPIDURAL; INFILTRATION; INTRACAUDAL; PERINEURAL AS NEEDED
Status: COMPLETED | OUTPATIENT
Start: 2019-11-05 | End: 2019-11-05

## 2019-11-05 RX ORDER — LIDOCAINE HYDROCHLORIDE AND EPINEPHRINE 15; 5 MG/ML; UG/ML
INJECTION, SOLUTION EPIDURAL AS NEEDED
Status: DISCONTINUED | OUTPATIENT
Start: 2019-11-05 | End: 2019-11-07 | Stop reason: HOSPADM

## 2019-11-05 RX ORDER — OXYTOCIN 10 [USP'U]/ML
INJECTION, SOLUTION INTRAMUSCULAR; INTRAVENOUS
Status: COMPLETED
Start: 2019-11-05 | End: 2019-11-05

## 2019-11-05 RX ORDER — OXYTOCIN 10 [USP'U]/ML
10 INJECTION, SOLUTION INTRAMUSCULAR; INTRAVENOUS ONCE
Status: COMPLETED | OUTPATIENT
Start: 2019-11-05 | End: 2019-11-05

## 2019-11-05 RX ORDER — IBUPROFEN 400 MG/1
800 TABLET ORAL
Status: DISCONTINUED | OUTPATIENT
Start: 2019-11-05 | End: 2019-11-06 | Stop reason: HOSPADM

## 2019-11-05 RX ORDER — OXYTOCIN/RINGER'S LACTATE 20/1000 ML
125 PLASTIC BAG, INJECTION (ML) INTRAVENOUS CONTINUOUS
Status: DISCONTINUED | OUTPATIENT
Start: 2019-11-05 | End: 2019-11-05

## 2019-11-05 RX ORDER — PROMETHAZINE HYDROCHLORIDE 25 MG/ML
25 INJECTION, SOLUTION INTRAMUSCULAR; INTRAVENOUS
Status: DISCONTINUED | OUTPATIENT
Start: 2019-11-05 | End: 2019-11-06 | Stop reason: HOSPADM

## 2019-11-05 RX ORDER — PHENYLEPHRINE HCL IN 0.9% NACL 0.4MG/10ML
100 SYRINGE (ML) INTRAVENOUS AS NEEDED
Status: DISCONTINUED | OUTPATIENT
Start: 2019-11-05 | End: 2019-11-05

## 2019-11-05 RX ORDER — BUPIVACAINE HYDROCHLORIDE 2.5 MG/ML
INJECTION, SOLUTION EPIDURAL; INFILTRATION; INTRACAUDAL AS NEEDED
Status: DISCONTINUED | OUTPATIENT
Start: 2019-11-05 | End: 2019-11-07 | Stop reason: HOSPADM

## 2019-11-05 RX ORDER — SODIUM CHLORIDE 0.9 % (FLUSH) 0.9 %
5-40 SYRINGE (ML) INJECTION AS NEEDED
Status: DISCONTINUED | OUTPATIENT
Start: 2019-11-05 | End: 2019-11-05

## 2019-11-05 RX ORDER — PROMETHAZINE HYDROCHLORIDE 25 MG/ML
12.5 INJECTION, SOLUTION INTRAMUSCULAR; INTRAVENOUS
Status: DISCONTINUED | OUTPATIENT
Start: 2019-11-05 | End: 2019-11-05

## 2019-11-05 RX ORDER — SALICYLIC ACID
90 POWDER (GRAM) MISCELLANEOUS ONCE
Status: DISCONTINUED | OUTPATIENT
Start: 2019-11-05 | End: 2019-11-05

## 2019-11-05 RX ORDER — NALOXONE HYDROCHLORIDE 0.4 MG/ML
0.2 INJECTION, SOLUTION INTRAMUSCULAR; INTRAVENOUS; SUBCUTANEOUS AS NEEDED
Status: DISCONTINUED | OUTPATIENT
Start: 2019-11-05 | End: 2019-11-05

## 2019-11-05 RX ORDER — SODIUM CHLORIDE, SODIUM LACTATE, POTASSIUM CHLORIDE, CALCIUM CHLORIDE 600; 310; 30; 20 MG/100ML; MG/100ML; MG/100ML; MG/100ML
125 INJECTION, SOLUTION INTRAVENOUS CONTINUOUS
Status: DISCONTINUED | OUTPATIENT
Start: 2019-11-05 | End: 2019-11-05

## 2019-11-05 RX ORDER — TERBUTALINE SULFATE 1 MG/ML
0.25 INJECTION SUBCUTANEOUS
Status: DISCONTINUED | OUTPATIENT
Start: 2019-11-05 | End: 2019-11-05

## 2019-11-05 RX ORDER — DIPHENHYDRAMINE HYDROCHLORIDE 50 MG/ML
25 INJECTION, SOLUTION INTRAMUSCULAR; INTRAVENOUS
Status: DISCONTINUED | OUTPATIENT
Start: 2019-11-05 | End: 2019-11-05

## 2019-11-05 RX ORDER — SODIUM CHLORIDE 0.9 % (FLUSH) 0.9 %
5-40 SYRINGE (ML) INJECTION EVERY 8 HOURS
Status: DISCONTINUED | OUTPATIENT
Start: 2019-11-05 | End: 2019-11-05

## 2019-11-05 RX ORDER — NALBUPHINE HYDROCHLORIDE 10 MG/ML
10 INJECTION, SOLUTION INTRAMUSCULAR; INTRAVENOUS; SUBCUTANEOUS
Status: DISCONTINUED | OUTPATIENT
Start: 2019-11-05 | End: 2019-11-05

## 2019-11-05 RX ORDER — OXYTOCIN/RINGER'S LACTATE 20/1000 ML
999 PLASTIC BAG, INJECTION (ML) INTRAVENOUS ONCE
Status: COMPLETED | OUTPATIENT
Start: 2019-11-05 | End: 2019-11-05

## 2019-11-05 RX ORDER — AMOXICILLIN 250 MG
1 CAPSULE ORAL
Status: DISCONTINUED | OUTPATIENT
Start: 2019-11-05 | End: 2019-11-06 | Stop reason: HOSPADM

## 2019-11-05 RX ADMIN — BUPIVACAINE HYDROCHLORIDE 5 ML: 2.5 INJECTION, SOLUTION EPIDURAL; INFILTRATION; INTRACAUDAL; PERINEURAL at 03:24

## 2019-11-05 RX ADMIN — OXYTOCIN 10 UNITS: 10 INJECTION, SOLUTION INTRAMUSCULAR; INTRAVENOUS at 04:24

## 2019-11-05 RX ADMIN — METHYLERGONOVINE MALEATE 0.2 MG: 0.2 INJECTION, SOLUTION INTRAMUSCULAR; INTRAVENOUS at 04:28

## 2019-11-05 RX ADMIN — LIDOCAINE HYDROCHLORIDE 10 ML: 10 INJECTION, SOLUTION EPIDURAL; INFILTRATION; INTRACAUDAL; PERINEURAL at 04:35

## 2019-11-05 RX ADMIN — LIDOCAINE HYDROCHLORIDE,EPINEPHRINE BITARTRATE 3 ML: 15; .005 INJECTION, SOLUTION EPIDURAL; INFILTRATION; INTRACAUDAL; PERINEURAL at 03:21

## 2019-11-05 RX ADMIN — IBUPROFEN 800 MG: 400 TABLET ORAL at 15:24

## 2019-11-05 RX ADMIN — Medication 10 ML/HR: at 03:27

## 2019-11-05 RX ADMIN — BUPIVACAINE HYDROCHLORIDE 5 ML: 2.5 INJECTION, SOLUTION EPIDURAL; INFILTRATION; INTRACAUDAL; PERINEURAL at 03:27

## 2019-11-05 RX ADMIN — MISOPROSTOL 800 MCG: 200 TABLET ORAL at 04:20

## 2019-11-05 RX ADMIN — SODIUM CHLORIDE, SODIUM LACTATE, POTASSIUM CHLORIDE, AND CALCIUM CHLORIDE 500 ML: 600; 310; 30; 20 INJECTION, SOLUTION INTRAVENOUS at 02:50

## 2019-11-05 RX ADMIN — IBUPROFEN 800 MG: 400 TABLET ORAL at 06:14

## 2019-11-05 RX ADMIN — OXYTOCIN 10 UNITS: 10 INJECTION INTRAVENOUS at 04:24

## 2019-11-05 RX ADMIN — Medication 19980 MILLI-UNITS/HR: at 04:12

## 2019-11-05 RX ADMIN — SODIUM CHLORIDE, SODIUM LACTATE, POTASSIUM CHLORIDE, AND CALCIUM CHLORIDE 1000 ML: 600; 310; 30; 20 INJECTION, SOLUTION INTRAVENOUS at 02:10

## 2019-11-05 RX ADMIN — Medication 10 ML/HR: at 03:30

## 2019-11-05 NOTE — ANESTHESIA PREPROCEDURE EVALUATION
Relevant Problems   No relevant active problems       Anesthetic History   No history of anesthetic complications            Review of Systems / Medical History  Patient summary reviewed, nursing notes reviewed and pertinent labs reviewed    Pulmonary  Within defined limits                 Neuro/Psych   Within defined limits           Cardiovascular  Within defined limits                     GI/Hepatic/Renal  Within defined limits              Endo/Other        Obesity     Other Findings              Physical Exam    Airway  Mallampati: II  TM Distance: 4 - 6 cm  Neck ROM: normal range of motion        Cardiovascular  Regular rate and rhythm,  S1 and S2 normal,  no murmur, click, rub, or gallop             Dental  No notable dental hx       Pulmonary  Breath sounds clear to auscultation               Abdominal  GI exam deferred       Other Findings            Anesthetic Plan    ASA: 2  Anesthesia type: epidural            Anesthetic plan and risks discussed with: Patient and Family

## 2019-11-05 NOTE — ANESTHESIA PROCEDURE NOTES
Epidural Block    Start time: 11/5/2019 3:07 AM  End time: 11/5/2019 3:32 AM  Performed by: Janet Jackson CRNA  Authorized by: Janet Jackson CRNA     Pre-Procedure  Indication: labor epidural    Preanesthetic Checklist: patient identified, risks and benefits discussed, anesthesia consent, site marked, patient being monitored, timeout performed and anesthesia consent    Timeout Time: 03:08        Epidural:   Patient position:  Seated  Prep region:  Lumbar  Prep: Betadine    Location:  L3-4    Needle and Epidural Catheter:   Needle Type:  Tuohy  Needle Gauge:  18 G  Injection Technique:  Loss of resistance using saline  Attempts:  1  Catheter Size:  20 G  Catheter at Skin Depth (cm):  16  Depth in Epidural Space (cm):  8  Events: no blood with aspiration, no cerebrospinal fluid with aspiration, no paresthesia and negative aspiration test    Test Dose:  Negative    Assessment:   Catheter Secured:  Tegaderm and tape  Insertion:  Uncomplicated  Patient tolerance:  Patient tolerated the procedure well with no immediate complications

## 2019-11-05 NOTE — PROGRESS NOTES
0720- Bedside and Verbal shift change report given to Soraya silva (oncoming nurse) by J. Stephanie Buerger rn (offgoing nurse). Report included the following information SBAR, Kardex, Procedure Summary, Intake/Output, MAR and Recent Results. Patient aware of hourly rounding and not waking patient if sleeping. 8805- Assessment completed at this time. Call light in reach. Bed low position, bed brakes on, bed rails x2. No needs. Updated on plan of care. 0930- Up to bathroom to void. No other needs. 1140- Reassessment at this time. Call light in reach. No needs. 1520- Reassessment completed at this time. Call light in reach. Bed low position, bed brakes on, bed rails x2. No needs. Updated on plan of care. 1635- sitting up in bed. No needs. 1805- Breast feeding baby. No needs. 200- Mother doing well, up without complaints, voiding without problems. Pain managed well with motrin. Bonding well with baby.

## 2019-11-05 NOTE — PROGRESS NOTES
Visited with mother and acquired permission to announce baby's name.     Reba Denson   Spiritual Care   [phone]

## 2019-11-05 NOTE — ROUTINE PROCESS
0210 Assumed care of patient, entered room, FOB at bedside, peripheral IV started, patient requests epidural. 
0310 Sarah Boyd CRNA at bedside to place epidural catheter. [id number] Test dose through epidural catheter by Sarah Boyd CRNA. 0350 SVE 10100/+1, Nieves Melton CRNA called to bedside. 4150 Praneeth Gillis CNM at bedside, patient begins to push. 0405 FSE placed by Nieves Melton CNM. 6368  of viable baby girl over 1st degree tear, repaired by Nieves Melton CNM,  ml. 
0778 Patient ambulated to restroom with assistance and was able to void, lazaro-care given and pads changed. 7622 TRANSFER - OUT REPORT: 
 
Verbal report given to MAR[facility] Abbott RN([facility] to [address] [place] [address] (unit) for routine progression of care Report consisted of patients Situation, Background, Assessment and  
Recommendations(SBAR). Information from the following report(s) SBAR, Intake/Output, MAR and Med Rec Status was reviewed with the receiving nurse. Lines:  
Peripheral IV 19 Anterior;Distal;Right Wrist (Active) Site Assessment Clean, dry, & intact 2019  2:33 AM  
Phlebitis Assessment 0 2019  2:33 AM  
Infiltration Assessment 0 2019  2:33 AM  
Dressing Status Clean, dry, & intact 2019  2:33 AM  
Dressing Type Tape;Transparent 2019  2:33 AM  
Hub Color/Line Status Pink; Infusing 2019  2:33 AM  
Alcohol Cap Used Yes 2019  2:33 AM  
  
 
Opportunity for questions and clarification was provided. Patient transported with: 
 Registered Nurse

## 2019-11-05 NOTE — L&D DELIVERY NOTE
Delivery Summary      Antonio Fairbanks progressed quickly to 10 cms and pushed well with effective epidural.  FHR with variable decels to 80 with .  of VFi over 1st degree right and left vaginal/inner labial lac.peds present secondary to meconium. Baby to mom's chest with spont cry and pink color. apgar 8/9. Cord double clamped , then cut by me after placental transfusion complete. Placenta delivered spont intact with 3 CV, increased bleeding noted. IV pitocin infusing, added 10 units for total of 30 units; then cytotec 25mcq, then methergine . 2 IM. Responded well . EBl 750cc. 1st degree lasc repaired with 3-0 vicryl  Dr Awa Prater in house. informed of status. Ania        Patient: Caryle Hun MRN: [id number]  SSN: [id number]    YOB: 1985  Age: 27 y.o. Sex: female       Information for the patient's :  [place],  Antonio Fairbanks [[id number]]       Labor Events:    Labor:      Steroids:     Cervical Ripening Date/Time:       Cervical Ripening Type:     Antibiotics During Labor:     Rupture Identifier:      Rupture Date/Time: 2019 12:40 AM   Rupture Type: SROM   Amniotic Fluid Volume:      Amniotic Fluid Description: Meconium    Amniotic Fluid Odor: None    Induction:         Induction Date/Time:        Indications for Induction:      Augmentation:     Augmentation Date/Time:      Indications for Augmentation:     Labor complications:          Additional complications:        Delivery Events:  Indications For Episiotomy:     Episiotomy:     Perineal Laceration(s):     Repaired:     Periurethral Laceration Location:      Repaired:     Labial Laceration Location:     Repaired:     Sulcal Laceration Location:     Repaired:     Vaginal Laceration Location:     Repaired:     Cervical Laceration Location:     Repaired:     Repair Suture:     Number of Repair Packets:     Estimated Blood Loss (ml):  ml     Delivery Date: 2019    Delivery Time: 4:12 AM  Delivery Type:    Sex:  Female Gestational Age: 44w3d   Delivery Clinician:     Living Status: Living   Delivery Location: L&D            APGARS  One minute Five minutes Ten minutes   Skin color: 0   1        Heart rate: 2   2        Grimace: 2   2        Muscle tone: 2   2        Breathin   2        Totals: 8   9            Presentation:      Position:        Resuscitation Method:  Suctioning-bulb;Suctioning-deep; Tactile Stimulation     Meconium Stained: Thin      Cord Information:    Complications:    Cord around:    Delayed cord clamping? Cord clamped date/time:   Disposition of Cord Blood:      Blood Gases Sent?:      Placenta:  Date/Time:    Removal:        Appearance:       Turon Measurements:  Birth Weight:        Birth Length:        Head Circumference:        Chest Circumference:       Abdominal Girth: Other Providers:    , Obstetrician;Primary Nurse;Primary Turon Nurse;Nicu Nurse;Neonatologist;Anesthesiologist;Crna;Nurse Practitioner;Midwife;Nursery Nurse           Group B Strep:   Lab Results   Component Value Date/Time    GrBStrep, External Negative 10/08/2019     Information for the patient's :  [employer], [facility] Staff [[id number]]   No results found for: ABORH, PCTABR, PCTDIG, BILI, ABORHEXT, ABORH    No results for input(s): PCO2CB, PO2CB, HCO3I, SO2I, IBD, PTEMPI, SPECTI, PHICB, ISITE, IDEV, IALLEN in the last 72 hours.

## 2019-11-05 NOTE — PROGRESS NOTES
0102 patient arrived to unit via wheelchair c/o contractions that started around 2230 and SROM at [address] patient placed on EFM. States concerned over color of fluid. Did not flush toilet so it could be looked at.  Upon further assessment, meconium fluid noted on panty liner  0121 SVE 3/80/-1   0125 N Hillary notified of SVE and meconium fluid

## 2019-11-05 NOTE — H&P
Obstetric Admission History and Physical    Name: Bernardo Romberg MRN: [id number] [id number]: [id number]    YOB: 1985  Age: 27 y.o. Sex: female       Subjective:      Chief complaint:    Chief Complaint   Patient presents with    Contractions    Rupture of Membranes       Stephani Marte is a 27 y.o.  female, G 1 P 0 who presents at 40.1 weeks gestation with c/o contractions. On admission EFM strip is obtained and is Category 1.     OB HISTORY  Prenatal care started at 40.1 wks with [address]. TVS supporting dates and viability. Problems of pregnancy include  AMA= NIPT XX/  HSV on prophylaxis valtrex . Total wt gain 21 lbs. GBS neg.     PAST MEDICAL / SURGICAL HISTORY  Past Medical History:   Diagnosis Date    Abnormal pap 2011    history of this, s/p colpolscopy x 2    Genital HSV 6.2013    pn prophylaxis acyclovir    H/O seasonal allergies     OCD (obsessive compulsive disorder)     Small fiber neuropathy     Vitamin D deficiency 11/2018     Problem List as of 11/5/2019 Date Reviewed: 11/5/2019          Codes Class Noted - Resolved    * (Principal) Labor and delivery indication for care or intervention ICD-10-CM: O75.9  ICD-9-CM: 659.90  11/5/2019 - Present        Multigravida of advanced maternal age in third trimester ICD-10-CM: O09.523  ICD-9-CM: 659.63  11/5/2019 - Present        OCD (obsessive compulsive disorder) ICD-10-CM: F42.9  ICD-9-CM: 300.3  Unknown - Present        Genital HSV ICD-10-CM: A60.00  ICD-9-CM: 054.10  Unknown - Present        Abnormal pap ICD-10-CM: COM5146  ICD-9-CM: MEM0966  Unknown - Present        RESOLVED: Vitamin D deficiency ICD-10-CM: E55.9  ICD-9-CM: 268.9  11/1/2018 - 11/5/2019        RESOLVED: IUD (intrauterine device) in place ICD-10-CM: Z97.5  ICD-9-CM: V45.51  12/14/2015 - 11/5/2019        RESOLVED: H/O seasonal allergies ICD-10-CM: Z88.9  ICD-9-CM: V15.09  Unknown - 11/5/2019              FAMILY/ SOCIAL HISTORY  Social History     Socioeconomic History    Marital status:      Spouse name: Not on file    Number of children: Not on file    Years of education: Not on file    Highest education level: Not on file   Occupational History    Not on file   Social Needs    Financial resource strain: Not on file    Food insecurity:     Worry: Not on file     Inability: Not on file    Transportation needs:     Medical: Not on file     Non-medical: Not on file   Tobacco Use    Smoking status: Never Smoker    Smokeless tobacco: Never Used    Tobacco comment: Pt counseled to continue to not smoke. Substance and Sexual Activity    Alcohol use:  Yes     Alcohol/week: 0.0 standard drinks     Comment: socially    Drug use: No    Sexual activity: Yes     Partners: Male     Birth control/protection: Inserts, Implant     Comment: nuvaring   Lifestyle    Physical activity:     Days per week: Not on file     Minutes per session: Not on file    Stress: Not on file   Relationships    Social connections:     Talks on phone: Not on file     Gets together: Not on file     Attends Islam service: Not on file     Active member of club or organization: Not on file     Attends meetings of clubs or organizations: Not on file     Relationship status: Not on file    Intimate partner violence:     Fear of current or ex partner: Not on file     Emotionally abused: Not on file     Physically abused: Not on file     Forced sexual activity: Not on file   Other Topics Concern     Service No    Blood Transfusions No    Caffeine Concern No    Occupational Exposure No    Hobby Hazards No    Sleep Concern No    Stress Concern No    Weight Concern Yes    Special Diet Yes     Comment: veg    Back Care Not Asked    Exercise Yes    Bike Helmet Not Asked    Seat Belt Yes    Self-Exams Yes   Social History Narrative    ** Merged History Encounter **               ALLERGY:  No Known Allergies    Review of Systems:  A comprehensive review of systems was negative Objective:     VITAL SIGNS:  Visit Vitals  Ht 5' 6\" (1.676 m)   Wt 96.6 kg (213 lb)   BMI 34.38 kg/m²       Physical Exam:  Abdomen: soft  Position of baby vtx  Estimated fetal weight 7 lbs  Contractions q 3-4 mins/mod quality  FHR baseline at  130 bpm/ variability mod/Category 1/accels  External Genitalia: normal general appearance without lesions  Cervix: Dilation: 3cm  Membranes  SROm of clear fluid at 0100    Assessment:     1) 40.1 weeks Intrauterine Pregnancy .   2) labor management  3) AMA NIPT XX  4) HSV with prophylaxis    Plan:     Reassuring fetal status, Continue plan for vaginal delivery    Dr Milagros Britt MD  notified and aware of admission    Signed By:  Alex Bhakta CNM     November 5, 2019

## 2019-11-05 NOTE — LACTATION NOTE
Mother states baby has nursed three times since birth 12 hours ago. Discussed latch, positioning, feeding frequency,  feeding patterns/expectations in the first 24 hours, wet/dirty diapers, colustrum, size of tummy, milk coming in, pumping and nipple care. Gave BF information, daily log and resource guide. General discussion, questions answered. Offered assistance if needed.

## 2019-11-06 VITALS
WEIGHT: 213 LBS | HEIGHT: 66 IN | SYSTOLIC BLOOD PRESSURE: 99 MMHG | OXYGEN SATURATION: 99 % | BODY MASS INDEX: 34.23 KG/M2 | RESPIRATION RATE: 17 BRPM | DIASTOLIC BLOOD PRESSURE: 67 MMHG | HEART RATE: 68 BPM | TEMPERATURE: 97.9 F

## 2019-11-06 PROBLEM — D64.9 ANEMIA: Status: ACTIVE | Noted: 2019-11-06

## 2019-11-06 LAB
HCT VFR BLD AUTO: 28.9 % (ref 35–45)
HGB BLD-MCNC: 9.4 G/DL (ref 12–16)

## 2019-11-06 PROCEDURE — 85014 HEMATOCRIT: CPT

## 2019-11-06 PROCEDURE — 36415 COLL VENOUS BLD VENIPUNCTURE: CPT

## 2019-11-06 PROCEDURE — 74011250637 HC RX REV CODE- 250/637: Performed by: ADVANCED PRACTICE MIDWIFE

## 2019-11-06 PROCEDURE — 85018 HEMOGLOBIN: CPT

## 2019-11-06 RX ORDER — IBUPROFEN 800 MG/1
800 TABLET ORAL
Qty: 60 TAB | Refills: 1 | Status: SHIPPED | OUTPATIENT
Start: 2019-11-06 | End: 2020-03-25

## 2019-11-06 RX ORDER — GLUC/MSM/COLGN2/HYAL/ANTIARTH3 375-375-20
1 TABLET ORAL DAILY
Qty: 30 TAB | Refills: 1 | Status: SHIPPED | OUTPATIENT
Start: 2019-11-06 | End: 2020-03-25

## 2019-11-06 RX ADMIN — IBUPROFEN 800 MG: 400 TABLET ORAL at 04:18

## 2019-11-06 RX ADMIN — IBUPROFEN 800 MG: 400 TABLET ORAL at 11:37

## 2019-11-06 NOTE — ROUTINE PROCESS
Verbal shift change report given to eHrmilo Urias RN by Tomas Verduzco RN. Report included the following information SBAR, Kardex, Procedure Summary, Intake/Output, MAR, Accordion, Recent Results and Med Rec Status.

## 2019-11-06 NOTE — ROUTINE PROCESS
TRANSFER - IN REPORT: 
 
Verbal report received from GIUSEPPE Velez RN on [facility]vey[facility] [place]s  being received from Mother/Baby for routine progression of care Report consisted of patients Situation, Background, Assessment and  
Recommendations(SBAR). Information from the following report(s) SBAR, Kardex, Intake/Output, MAR and Recent Results was reviewed with the receiving nurse. Opportunity for questions and clarification was provided.

## 2019-11-06 NOTE — PROGRESS NOTES
I have reviewed discharge instructions with the patient. The patient verbalized understanding. Time allowed for questions and concerns. All questions answered and concerns addressed. Electronic signature obtained. 1209- Patient escorted off the unit by this RN.

## 2019-11-07 NOTE — ANESTHESIA POSTPROCEDURE EVALUATION
* No procedures listed *.    epidural     Per nursing and physician notes pt was discharged 11/6 at 2353 with no complications. Anesthesia Post Evaluation      Multimodal analgesia: multimodal analgesia used between 6 hours prior to anesthesia start to PACU discharge  Patient location: pt d/c 11/6. Patient participation: complete - patient participated  Level of consciousness: awake and alert  Pain management: satisfactory to patient  Airway patency: patent  Anesthetic complications: no  Cardiovascular status: acceptable  Respiratory status: acceptable  Hydration status: acceptable  Post anesthesia nausea and vomiting:  none      No vitals data found for the desired time range.

## 2019-11-16 NOTE — DISCHARGE SUMMARY
Obstetrical Discharge Summary       [facility]  [address]  [address] [facility]  [phone]        Patient ID:Kalyn HAMEED,[id number],89 y.o.,1985    Postpartum Day: Information for the patient's :  BG Linda Lima [[id number]]   12 days       Admit Date: 2019    Discharge Date: 2019     Admitting Physician: Memo Domingo MD     Admission Diagnoses: Labor and delivery indication for care or intervention [O75.9]    Discharge Diagnoses: same as above      Additional Diagnoses:Present on Admission:   (Resolved) Labor and delivery indication for care or intervention   Genital HSV   Multigravida of advanced maternal age in third trimester       Patient Active Problem List   Diagnosis Code    Genital HSV A60.00    Abnormal pap [id number]    OCD (obsessive compulsive disorder) F42.9   [facility] Multigravida of advanced maternal age in third trimester O09.523    Anemia D64.9         Procedure:        Baby link  Information for the patient's :  BG Linda Lima [[id number]]     Delivery Type: Vaginal, Spontaneous   Delivery Date: 2019   Delivery Time: 4:12 AM     Birth Weight: 3.12 kg     Sex:  female  Delivery Clinician:  Azam Bui   Gestational Age: 44w3d    Presentation: Vertex   Position: Right  Occiput  Anterior     Apgars were 8  and 9      Resuscitation Method: Suctioning-bulb;Suctioning-deep; Tactile Stimulation     Meconium Stained:  Thin    Living Status: Living       Placenta Date/Time: 2019  3:22 AM   Placenta Removal: Spontaneous   Placenta Appearance: Normal    Cord Information: 3 Vessels    Cord Events: None       Disposition of Cord Blood: Lab    Blood Gases Sent?:  No         Baby procedures:    Information for the patient's :  [facility] Lima [[id number]]          Feeding Method: Infant Feeding: Breast Milk    Immunizations:    Immunization History   Administered Date(s) Administered    Influenza Vaccine 2019    Influenza Vaccine (Quad) PF 10/21/2015, 11/19/2018    Tdap 11/19/2018, 09/05/2019        Immunizations:    Immunization History   Administered Date(s) Administered    Influenza Vaccine 09/17/2019    Influenza Vaccine (Quad) PF 10/21/2015, 11/19/2018    Tdap 11/19/2018, 09/05/2019       Group Beta Strep:   GrBStrep, External   Date Value Ref Range Status   10/08/2019 Negative  Final          WBC   Date/Time Value Ref Range Status   11/05/2019 07:05 AM 21.8 (H) 4.6 - 13.2 K/uL Final   11/05/2019 02:12 AM 16.0 (H) 4.6 - 13.2 K/uL Final   11/14/2018 09:12 AM 5.0 4.6 - 13.2 K/uL Final     HGB   Date/Time Value Ref Range Status   11/06/2019 05:30 AM 9.4 (L) 12.0 - 16.0 g/dL Final   11/05/2019 07:05 AM 11.2 (L) 12.0 - 16.0 g/dL Final   11/05/2019 02:12 AM 13.1 12.0 - 16.0 g/dL Final     PLATELET   Date/Time Value Ref Range Status   11/05/2019 07:05  135 - 420 K/uL Final         Hospital Course: Unremarkable  Subjective:         Cholo Benavides is doing and feeling well. Declines feeling dizzy or light headed. Nursing well. Would like to go home. Objective:   Breasts Nontender and intact  Fundus firm and involuting  Lochia rubra, minimal  Legs minimal to no edema and without pain. Lab/Data Review:  No data found. No data recorded. WBC   Date/Time Value Ref Range Status   11/05/2019 07:05 AM 21.8 (H) 4.6 - 13.2 K/uL Final   11/05/2019 02:12 AM 16.0 (H) 4.6 - 13.2 K/uL Final   11/14/2018 09:12 AM 5.0 4.6 - 13.2 K/uL Final     HGB   Date/Time Value Ref Range Status   11/06/2019 05:30 AM 9.4 (L) 12.0 - 16.0 g/dL Final   11/05/2019 07:05 AM 11.2 (L) 12.0 - 16.0 g/dL Final   11/05/2019 02:12 AM 13.1 12.0 - 16.0 g/dL Final     PLATELET   Date/Time Value Ref Range Status   11/05/2019 07:05  135 - 420 K/uL Final     Patient Instructions:   Cannot display discharge medications since this patient is not currently admitted.       Assessment:     Status post: postpartum vaginal delivery   Recovering well  Breastfeeding  Mood Stable      Plan:     Postpartum care discussed including diet, ambulation,actvitiy restrictions, and mood fluctuations. Discharge instructions and questions answered for vaginal delivery.   Follow in 6 wks or prn      Signed By: Maricarmen Norman CNM     November 16, 2019

## 2020-03-25 ENCOUNTER — VIRTUAL VISIT (OUTPATIENT)
Dept: INTERNAL MEDICINE CLINIC | Age: 35
End: 2020-03-25

## 2020-03-25 DIAGNOSIS — M54.50 ACUTE LEFT-SIDED LOW BACK PAIN, UNSPECIFIED WHETHER SCIATICA PRESENT: Primary | ICD-10-CM

## 2020-03-25 NOTE — PROGRESS NOTES
HISTORY OF PRESENT ILLNESS  Tamie Weaver is a 27 y.o. female. 6 weeks pregnant. Reports her chiropractor believes she tore her psoas muscle which is causing compression on her back . Reports when she has muscle spasms she experiences some numbness through the front of her quad muscle, but quickly goes away once the muscle spasm has subsided. LOW BACK PAIN   The history is provided by the patient. This is a new problem. The current episode started more than 2 days ago (5 days ago). Episode frequency: intermittent spasms. The problem has been gradually worsening. Pertinent negatives include no chest pain, no headaches and no shortness of breath. The symptoms are aggravated by bending and twisting (lifting). The symptoms are relieved by heat and ice. She has tried acetaminophen for the symptoms. The treatment provided mild relief. Review of Systems   Constitutional: Positive for malaise/fatigue (pregnancy related ). Negative for chills and fever. Eyes: Negative for blurred vision and double vision. Respiratory: Negative for cough and shortness of breath. Cardiovascular: Negative for chest pain and palpitations. Genitourinary: Negative for dysuria, flank pain, frequency and urgency. Neurological: Positive for weakness (front left quad above knee with muscle spasms). Negative for dizziness, tingling and headaches. Denies uncontrolled urination or defecation. Physical Exam  Neurological:      Mental Status: She is alert and oriented to person, place, and time. Psychiatric:         Mood and Affect: Mood normal.         Behavior: Behavior normal.         Thought Content: Thought content normal.         ASSESSMENT and PLAN  Diagnoses and all orders for this visit:    1.  Acute left-sided low back pain, unspecified whether sciatica present    -Recommended patient continue Tylenol as needed  -Recommended patient review psoas muscle stretching and low back stretching to add to her daily routine  -Recommended patient try Epson salt soaks as needed  -Recommended patient contact her OB/GYN and see if they feel that it is safe for her to try Benadryl as needed  -Recommended patient continue heat as needed. -Recommended patient contact our office if her symptoms worsen or fail to get better. Follow-up and Dispositions    · Return if symptoms worsen or fail to improve.

## 2020-03-25 NOTE — PROGRESS NOTES
Thais Viera is a 27 y.o. female evaluated via telephone on 3/25/2020. Consent:  She and/or health care decision maker is aware that that she may receive a bill for this telephone service, depending on her insurance coverage, and has provided verbal consent to proceed: Yes      Documentation:  I communicated with the patient and/or health care decision maker about low back pain. Details of this discussion including any medical advice provided: Please see following note. I affirm this is a Patient Initiated Episode with an Established Patient who has not had a related appointment within my department in the past 7 days or scheduled within the next 24 hours.     Total Time: minutes: 11-20 minutes    Note: not billable if this call serves to triage the patient into an appointment for the relevant concern      Lady Pierre NP

## 2020-08-12 ENCOUNTER — VIRTUAL VISIT (OUTPATIENT)
Dept: INTERNAL MEDICINE CLINIC | Age: 35
End: 2020-08-12

## 2020-08-12 DIAGNOSIS — J02.9 SORE THROAT: ICD-10-CM

## 2020-08-12 DIAGNOSIS — R07.89 FEELING OF CHEST TIGHTNESS: ICD-10-CM

## 2020-08-12 DIAGNOSIS — R09.81 CONGESTION OF NASAL SINUS: Primary | ICD-10-CM

## 2020-08-12 NOTE — PROGRESS NOTES
Hunter Raza is a 38 y.o. female (: 1985) presenting to address:    Chief Complaint   Patient presents with    Sore Throat     x two days    Headache    Other     patient c/o chest tightness       There were no vitals filed for this visit. Hearing/Vision:   No exam data present    Learning Assessment:     Learning Assessment 3/11/2014   PRIMARY LEARNER Patient   PRIMARY LANGUAGE ENGLISH   LEARNER PREFERENCE PRIMARY LISTENING   ANSWERED BY patient   RELATIONSHIP SELF     Depression Screening:     3 most recent PHQ Screens 2018   Little interest or pleasure in doing things Not at all   Feeling down, depressed, irritable, or hopeless Not at all   Total Score PHQ 2 0     Fall Risk Assessment:   No flowsheet data found. Abuse Screening:     Abuse Screening Questionnaire 2018   Do you ever feel afraid of your partner? N   Are you in a relationship with someone who physically or mentally threatens you? N   Is it safe for you to go home? Y     Coordination of Care Questionaire:   1. Have you been to the ER, urgent care clinic since your last visit? Hospitalized since your last visit? NO    2. Have you seen or consulted any other health care providers outside of the [address] [place] [address] since your last visit? Include any pap smears or colon screening. YES ob/gyn    Advanced Directive:   1. Do you have an Advanced Directive? NO    2. Would you like information on Advanced Directives?  NO

## 2020-08-12 NOTE — PROGRESS NOTES
Doron Alicea is a 38 y.o. female who was seen by synchronous (real-time) audio-video technology on 8/12/2020 for Sore Throat (x two days); Headache; and Other (patient c/o chest tightness)      Assessment & Plan:   Diagnoses and all orders for this visit:    1. Congestion of nasal sinus   - ? Viral    - Nasal washes prn   - Benadryl if needed    - Can use Novafed if gets worse    2. Sore throat   - ? Post nasal drip    - Recommended throat lozenges   -  warm water, honey and lemon gargles. 3. Feeling of chest tightness   - Offered albuterol treatments - patient  declined   - recommended use of dehumidifier     Follow-up and Dispositions    · Return if symptoms worsen or fail to improve. Subjective:       1) Sore throat- headache- chest tightness- started 2 days ago - Some sneezing and fatigue- she is 7 month pregnant - she has a history of strep - she did not notice any exudate in her throat looking in the mirror -  She has tried tylenol and nothing else. Unsure her headache felt better with tylenol.her daughter was sent home from with a low grade temperature  99.7 and fatigue, she is teething. Prior to Admission medications    Medication Sig Start Date End Date Taking? Authorizing Provider   docosahexaenoic acid (DHA PRENATAL PO) Take  by mouth daily. Yes Provider, Historical   cholecalciferol (VITAMIN D3) 1,000 unit tablet Take 1,000 Units by mouth daily. Yes Provider, Historical   prenatal 62/VUIS fum/folic/dha (PRENATAL-1 PO) Take  by mouth. Take one po daily   Yes Provider, Historical     Past Medical History:   Diagnosis Date    Abnormal pap 2011    history of this, s/p colpolscopy x 2    Genital HSV 6.2013    pn prophylaxis acyclovir    H/O seasonal allergies     OCD (obsessive compulsive disorder)     Small fiber neuropathy     Vitamin D deficiency 11/2018       Review of Systems   Constitutional: Positive for malaise/fatigue. Negative for chills and fever.    HENT: Positive for congestion and sore throat. No loss of taste or smell    Respiratory: Positive for cough. Negative for shortness of breath and wheezing. Cardiovascular: Negative for chest pain and palpitations. Gastrointestinal: Positive for nausea. Negative for abdominal pain, diarrhea and vomiting. Neurological: Positive for headaches. Objective:     Patient-Reported Vitals 8/12/2020   Patient-Reported Weight 208lbs        [INSTRUCTIONS:  \"[x]\" Indicates a positive item  \"[]\" Indicates a negative item  -- DELETE ALL ITEMS NOT EXAMINED]    Constitutional: [x] Appears well-developed and well-nourished [x] No apparent distress      [] Abnormal -     Mental status: [x] Alert and awake  [x] Oriented to person/place/time [x] Able to follow commands    [] Abnormal -     Eyes:   EOM    [x]  Normal    [] Abnormal -   Sclera  [x]  Normal    [] Abnormal -          Discharge [x]  None visible   [] Abnormal -     HENT: [x] Normocephalic, atraumatic  [] Abnormal -   [x] Mouth/Throat: Mucous membranes are moist    External Ears [x] Normal  [] Abnormal -    Neck: [x] No visualized mass [] Abnormal -     Pulmonary/Chest: [x] Respiratory effort normal   [x] No visualized signs of difficulty breathing or respiratory distress        [] Abnormal -      Musculoskeletal:   [x] Normal gait with no signs of ataxia         [x] Normal range of motion of neck        [] Abnormal -     Neurological:        [x] No Facial Asymmetry (Cranial nerve 7 motor function) (limited exam due to video visit)          [x] No gaze palsy        [] Abnormal -          Skin:        [x] No significant exanthematous lesions or discoloration noted on facial skin         [] Abnormal -            Psychiatric:       [x] Normal Affect [] Abnormal -        [x] No Hallucinations    Other pertinent observable physical exam findings:-        We discussed the expected course, resolution and complications of the diagnosis(es) in detail.   Medication risks, benefits, costs, interactions, and alternatives were discussed as indicated. I advised her to contact the office if her condition worsens, changes or fails to improve as anticipated. She expressed understanding with the diagnosis(es) and plan. Escobar Morton, who was evaluated through a patient-initiated, synchronous (real-time) audio-video encounter, and/or her healthcare decision maker, is aware that it is a billable service, with coverage as determined by her insurance carrier. She provided verbal consent to proceed: Yes, and patient identification was verified. It was conducted pursuant to the emergency declaration under the [address] [place] [address], [address] [place] [address] authority and the [email or web address] General Act. A caregiver was present when appropriate. Ability to conduct physical exam was limited. I was in the office. The patient was at home.       Roslyn Quintero NP

## 2021-08-19 DIAGNOSIS — Z13.89 SCREENING FOR CARDIOVASCULAR, RESPIRATORY, AND GENITOURINARY DISEASES: Primary | ICD-10-CM

## 2021-08-19 DIAGNOSIS — Z13.83 SCREENING FOR CARDIOVASCULAR, RESPIRATORY, AND GENITOURINARY DISEASES: Primary | ICD-10-CM

## 2021-08-19 DIAGNOSIS — Z13.1 SCREENING FOR DIABETES MELLITUS (DM): ICD-10-CM

## 2021-08-19 DIAGNOSIS — Z13.6 SCREENING FOR CARDIOVASCULAR, RESPIRATORY, AND GENITOURINARY DISEASES: Primary | ICD-10-CM

## 2021-08-19 DIAGNOSIS — Z13.220 SCREENING FOR LIPID DISORDERS: ICD-10-CM

## 2021-08-19 DIAGNOSIS — D64.9 ANEMIA, UNSPECIFIED TYPE: ICD-10-CM

## 2021-08-19 DIAGNOSIS — Z13.29 SCREENING FOR THYROID DISORDER: ICD-10-CM

## 2021-08-26 LAB
ALBUMIN SERPL-MCNC: 4.5 G/DL (ref 3.8–4.8)
ALBUMIN/GLOB SERPL: 1.6 {RATIO} (ref 1.2–2.2)
ALP SERPL-CCNC: 76 IU/L (ref 48–121)
ALT SERPL-CCNC: 11 IU/L (ref 0–32)
APPEARANCE UR: CLEAR
AST SERPL-CCNC: 12 IU/L (ref 0–40)
BILIRUB SERPL-MCNC: <0.2 MG/DL (ref 0–1.2)
BILIRUB UR QL STRIP: NEGATIVE
BUN SERPL-MCNC: 9 MG/DL (ref 6–20)
BUN/CREAT SERPL: 16 (ref 9–23)
CALCIUM SERPL-MCNC: 9.3 MG/DL (ref 8.7–10.2)
CHLORIDE SERPL-SCNC: 98 MMOL/L (ref 96–106)
CHOLEST SERPL-MCNC: 222 MG/DL (ref 100–199)
CO2 SERPL-SCNC: 22 MMOL/L (ref 20–29)
COLOR UR: YELLOW
CREAT SERPL-MCNC: 0.57 MG/DL (ref 0.57–1)
ERYTHROCYTE [DISTWIDTH] IN BLOOD BY AUTOMATED COUNT: 12.9 % (ref 11.7–15.4)
EST. AVERAGE GLUCOSE BLD GHB EST-MCNC: 114 MG/DL
GLOBULIN SER CALC-MCNC: 2.9 G/DL (ref 1.5–4.5)
GLUCOSE SERPL-MCNC: 70 MG/DL (ref 65–99)
GLUCOSE UR QL: NEGATIVE
HBA1C MFR BLD: 5.6 % (ref 4.8–5.6)
HCT VFR BLD AUTO: 41.4 % (ref 34–46.6)
HDLC SERPL-MCNC: 66 MG/DL
HGB BLD-MCNC: 13.3 G/DL (ref 11.1–15.9)
HGB UR QL STRIP: NEGATIVE
IMP & REVIEW OF LAB RESULTS: NORMAL
KETONES UR QL STRIP: NEGATIVE
LDLC SERPL CALC-MCNC: 139 MG/DL (ref 0–99)
LEUKOCYTE ESTERASE UR QL STRIP: NEGATIVE
MCH RBC QN AUTO: 28.9 PG (ref 26.6–33)
MCHC RBC AUTO-ENTMCNC: 32.1 G/DL (ref 31.5–35.7)
MCV RBC AUTO: 90 FL (ref 79–97)
MICRO URNS: NORMAL
NITRITE UR QL STRIP: NEGATIVE
PH UR STRIP: 6.5 [PH] (ref 5–7.5)
PLATELET # BLD AUTO: 499 X10E3/UL (ref 150–450)
POTASSIUM SERPL-SCNC: 4.5 MMOL/L (ref 3.5–5.2)
PROT SERPL-MCNC: 7.4 G/DL (ref 6–8.5)
PROT UR QL STRIP: NEGATIVE
RBC # BLD AUTO: 4.61 X10E6/UL (ref 3.77–5.28)
SODIUM SERPL-SCNC: 138 MMOL/L (ref 134–144)
SP GR UR: 1.01 (ref 1–1.03)
T4 FREE SERPL-MCNC: 1.18 NG/DL (ref 0.82–1.77)
TRIGL SERPL-MCNC: 96 MG/DL (ref 0–149)
TSH SERPL DL<=0.005 MIU/L-ACNC: 1.93 UIU/ML (ref 0.45–4.5)
UROBILINOGEN UR STRIP-MCNC: 0.2 MG/DL (ref 0.2–1)
VLDLC SERPL CALC-MCNC: 17 MG/DL (ref 5–40)
WBC # BLD AUTO: 9 X10E3/UL (ref 3.4–10.8)

## 2021-08-26 NOTE — PROGRESS NOTES
Results reviewed. Please call patient with results. Cholesterol is high I Encourage daily exercise for at least 30 min's for weight loss, increase fruit and vegetable intake,limit fried fatty and fast food. All other labs are within acceptable ranges.

## 2021-11-08 ENCOUNTER — OFFICE VISIT (OUTPATIENT)
Dept: INTERNAL MEDICINE CLINIC | Age: 36
End: 2021-11-08
Payer: COMMERCIAL

## 2021-11-08 VITALS
OXYGEN SATURATION: 100 % | SYSTOLIC BLOOD PRESSURE: 109 MMHG | TEMPERATURE: 97.1 F | HEIGHT: 66 IN | BODY MASS INDEX: 32.47 KG/M2 | DIASTOLIC BLOOD PRESSURE: 72 MMHG | HEART RATE: 83 BPM | WEIGHT: 202 LBS | RESPIRATION RATE: 18 BRPM

## 2021-11-08 DIAGNOSIS — Z00.00 ANNUAL PHYSICAL EXAM: Primary | ICD-10-CM

## 2021-11-08 DIAGNOSIS — Z23 ENCOUNTER FOR IMMUNIZATION: ICD-10-CM

## 2021-11-08 PROCEDURE — 90686 IIV4 VACC NO PRSV 0.5 ML IM: CPT | Performed by: NURSE PRACTITIONER

## 2021-11-08 PROCEDURE — 99395 PREV VISIT EST AGE 18-39: CPT | Performed by: NURSE PRACTITIONER

## 2021-11-08 RX ORDER — SERTRALINE HYDROCHLORIDE 50 MG/1
50 TABLET, FILM COATED ORAL DAILY
COMMUNITY

## 2021-11-08 NOTE — PROGRESS NOTES
[address] [facility]  Primary Care Office Visit - Complete Physical    Hiren Roberts is a 39 y.o. female presenting for annual physical.  : 1985        Assessment/Plan:     1. Annual physical exam  - went over labs     2. Encounter for immunization  - INFLUENZA VIRUS VAC QUAD,SPLIT,PRESV FREE SYRINGE IM      Follow-up and Dispositions    · Return in about 1 year (around 2022) for CPE. Orders & Diagnoses associated with This Encounter:         ICD-10-CM ICD-9-CM   1. Annual physical exam  Z00.00 V70.0   2. Encounter for immunization  Z23 V03.89       Orders Placed This Encounter    INFLUENZA VIRUS VAC QUAD,SPLIT,PRESV FREE SYRINGE IM (Flulaval, Fluzone, Fluarix) (43034)    sertraline (Zoloft) 50 mg tablet     Sig: Take 50 mg by mouth daily. Management plan & patient instructions reviewed with Margemarlin Armando, who voiced understanding. This document may have been created with the aid of dictation software. Text may contain errors, particularly phonetic errors. Lynette Collier NP-C  Internal Medicine  2021         Subjective   History:   Hiren Roberts is a 39 y.o. female presenting for an annual physical.    Patient has GYN Dr and Chastity Rice specialist for her Zoloft. Has been stressed she has been drinking to much with her stress. She is interested in medications for her alcohol abuse she will follow up with addiction medicine. Past Medical History:   Diagnosis Date    Abnormal pap     history of this, s/p colpolscopy x 2    Genital HSV 6.    pn prophylaxis acyclovir    H/O seasonal allergies     OCD (obsessive compulsive disorder)     Small fiber neuropathy     Vitamin D deficiency 2018     Past Surgical History:   Procedure Laterality Date    HX GYN  2011    s/p colpolscopy x 2    HX UROLOGICAL      dilation of urethra b/c freq UTI's    HX WISDOM TEETH EXTRACTION      only had 3 removed      reports that she has never smoked.  She has never used smokeless tobacco. She reports current alcohol use. She reports that she does not use drugs. Social History     Social History Narrative    ** Merged History Encounter **          Social History     Tobacco Use   Smoking Status Never Smoker   Smokeless Tobacco Never Used   Tobacco Comment    Pt counseled to continue to not smoke. Family History   Problem Relation Age of Onset    Diabetes Mother     Thyroid Disease Mother         hyperthryoidism    Liver Disease Mother         non alcoholic cirrhosis    Other Mother         fatty liver (LI)    Diabetes Maternal Grandfather     Heart Disease Maternal Grandfather         MI at young age in 42's   [facility] Heart Attack Maternal Grandfather     Hypertension Maternal Grandfather     Coronary Art Dis Maternal Grandfather     Hypertension Maternal Grandmother     Arthritis-osteo Father     Hypertension Paternal Grandmother     Cancer Paternal Grandfather         bone cancer,  of this in his 63's    Other Brother         Fatty liver    Stroke Paternal Aunt      No Known Allergies    Problem List:      Patient Active Problem List    Diagnosis    Anemia    Multigravida of advanced maternal age in third trimester    OCD (obsessive compulsive disorder)    Genital HSV    Abnormal pap       Medications:     Current Outpatient Medications   Medication Sig    sertraline (Zoloft) 50 mg tablet Take 50 mg by mouth daily. No current facility-administered medications for this visit. Review of Systems:     Review of Systems   Constitutional: Negative for chills, fever and malaise/fatigue. Eyes: Negative for blurred vision and double vision. Respiratory: Negative for cough, shortness of breath and wheezing. Cardiovascular: Negative for chest pain, palpitations and leg swelling. Gastrointestinal: Negative for abdominal pain, heartburn, nausea and vomiting. Genitourinary: Negative for dysuria, frequency and urgency.    Neurological: Negative for dizziness and headaches. Psychiatric/Behavioral: Negative for depression. The patient is not nervous/anxious. Objective   Physical Assessment:   VS:    Visit Vitals  /72 (BP 1 Location: Left upper arm)   Pulse 83   Temp 97.1 °F (36.2 °C) (Temporal)   Resp 18   Ht 5' 6\" (1.676 m)   Wt 202 lb (91.6 kg)   LMP 11/06/2021 (Approximate)   SpO2 100%   Breastfeeding Unknown   BMI 32.60 kg/m²         Physical Exam  Vitals and nursing note reviewed. Constitutional:       General: She is not in acute distress. Appearance: She is obese. She is not ill-appearing, toxic-appearing or diaphoretic. HENT:      Head: Normocephalic and atraumatic. Right Ear: Tympanic membrane, ear canal and external ear normal.      Left Ear: Tympanic membrane, ear canal and external ear normal.      Nose: Nose normal. No congestion. Mouth/Throat:      Mouth: Mucous membranes are moist.      Pharynx: Oropharynx is clear. Eyes:      General: No scleral icterus. Right eye: No discharge. Left eye: No discharge. Extraocular Movements: Extraocular movements intact. Conjunctiva/sclera: Conjunctivae normal.      Pupils: Pupils are equal, round, and reactive to light. Neck:      Thyroid: No thyromegaly. Vascular: No carotid bruit. Cardiovascular:      Rate and Rhythm: Normal rate and regular rhythm. Pulses: Normal pulses. Heart sounds: Normal heart sounds. Pulmonary:      Effort: Pulmonary effort is normal. No respiratory distress. Breath sounds: Normal breath sounds. No wheezing. Abdominal:      General: Abdomen is flat. Bowel sounds are normal. There is no distension. Palpations: Abdomen is soft. There is no mass. Tenderness: There is no abdominal tenderness. There is no guarding or rebound. Hernia: No hernia is present. Musculoskeletal:         General: No swelling or tenderness. Normal range of motion.       Cervical back: Normal range of motion. Right lower leg: No edema. Left lower leg: No edema. Lymphadenopathy:      Cervical: No cervical adenopathy. Skin:     General: Skin is warm and dry. Findings: No lesion or rash. Neurological:      General: No focal deficit present. Mental Status: She is alert and oriented to person, place, and time. Mental status is at baseline. Psychiatric:         Mood and Affect: Mood normal.         Behavior: Behavior normal.         Thought Content: Thought content normal.         Judgment: Judgment normal.         Records Review:           Recent Labs & Imaging:     No results found for this or any previous visit (from the past 12 hour(s)).

## 2021-11-08 NOTE — PROGRESS NOTES
Rubens Snow is a 39 y.o. female who presents for routine immunizations. She denies any symptoms , reactions or allergies that would exclude them from being immunized today. Risks and adverse reactions were discussed and the VIS was given to them. All questions were addressed. She was observed for 15 min post injection. There were no reactions observed.     Mckenzie Rivas

## 2021-11-08 NOTE — PROGRESS NOTES
ROOM # 2  Identified pt with two pt identifiers(name and ). Reviewed record in preparation for visit and have obtained necessary documentation. Chief Complaint   Patient presents with    Complete Physical      Zoe Lima preferred language for health care discussion is english/other. Is the patient using any DME equipment during OV? NO    Zoe Lima is due for:  Health Maintenance Due   Topic    COVID-19 Vaccine (1)    Cervical cancer screen     Flu Vaccine (1)     Health Maintenance reviewed and discussed per provider  Please order/place referral if appropriate. Advance Directive:  1. Do you have an advance directive in place? Patient Reply: NO    2. If not, would you like material regarding how to put one in place? NO    Coordination of Care:  1. Have you been to the ER, urgent care clinic since your last visit? Hospitalized since your last visit? NO    2. Have you seen or consulted any other health care providers outside of the [address] [place] [address] since your last visit? Include any pap smears or colon screening. NO    Patient is accompanied by self I have received verbal consent from Zoe Lima to discuss any/all medical information while they are present in the room. Learning Assessment:  Learning Assessment 3/11/2014   PRIMARY LEARNER Patient   PRIMARY LANGUAGE ENGLISH   LEARNER PREFERENCE PRIMARY LISTENING   ANSWERED BY patient   RELATIONSHIP SELF     Depression Screening:  3 most recent [facility] Screens 2021 2018 11/10/2018 3/27/2018 10/21/2015 3/11/2014   Little interest or pleasure in doing things Not at all Not at all Not at all Not at all Not at all Not at all   Feeling down, depressed, irritable, or hopeless Not at all Not at all Not at all Not at all Not at all Not at all   Total Score PHQ 2 0 0 0 0 0 0     Abuse Screening:  Abuse Screening Questionnaire 2018   Do you ever feel afraid of your partner?  N   Are you in a relationship with someone who physically or mentally threatens you? N   Is it safe for you to go home?  Y     Recent Travel Screening and Travel History documentation     Travel Screening     No screening recorded since 11/07/21 0000     Travel History   Travel since 10/08/21    No documented travel since 10/08/21

## 2022-03-19 PROBLEM — O09.523 MULTIGRAVIDA OF ADVANCED MATERNAL AGE IN THIRD TRIMESTER: Status: ACTIVE | Noted: 2019-11-05

## 2022-03-20 PROBLEM — D64.9 ANEMIA: Status: ACTIVE | Noted: 2019-11-06

## 2023-05-01 ENCOUNTER — TELEPHONE (OUTPATIENT)
Facility: CLINIC | Age: 38
End: 2023-05-01

## 2023-05-02 ENCOUNTER — TELEPHONE (OUTPATIENT)
Facility: CLINIC | Age: 38
End: 2023-05-02

## 2023-08-08 SDOH — HEALTH STABILITY: PHYSICAL HEALTH: ON AVERAGE, HOW MANY MINUTES DO YOU ENGAGE IN EXERCISE AT THIS LEVEL?: 30 MIN

## 2023-08-08 SDOH — HEALTH STABILITY: PHYSICAL HEALTH: ON AVERAGE, HOW MANY DAYS PER WEEK DO YOU ENGAGE IN MODERATE TO STRENUOUS EXERCISE (LIKE A BRISK WALK)?: 2 DAYS

## 2023-08-09 DIAGNOSIS — Z13.0 SCREENING, ANEMIA, DEFICIENCY, IRON: Primary | ICD-10-CM

## 2023-08-09 DIAGNOSIS — E78.5 HYPERLIPIDEMIA, UNSPECIFIED HYPERLIPIDEMIA TYPE: ICD-10-CM

## 2023-08-09 DIAGNOSIS — F32.A ANXIETY AND DEPRESSION: ICD-10-CM

## 2023-08-09 DIAGNOSIS — E66.9 CLASS 1 OBESITY WITH BODY MASS INDEX (BMI) OF 32.0 TO 32.9 IN ADULT, UNSPECIFIED OBESITY TYPE, UNSPECIFIED WHETHER SERIOUS COMORBIDITY PRESENT: ICD-10-CM

## 2023-08-09 DIAGNOSIS — F41.9 ANXIETY AND DEPRESSION: ICD-10-CM

## 2023-08-10 LAB
ALBUMIN SERPL-MCNC: 4.8 G/DL (ref 3.9–4.9)
ALBUMIN/GLOB SERPL: 1.5 {RATIO} (ref 1.2–2.2)
ALP SERPL-CCNC: 79 IU/L (ref 44–121)
ALT SERPL-CCNC: 10 IU/L (ref 0–32)
AST SERPL-CCNC: 15 IU/L (ref 0–40)
BILIRUB SERPL-MCNC: 0.3 MG/DL (ref 0–1.2)
BUN SERPL-MCNC: 13 MG/DL (ref 6–20)
BUN/CREAT SERPL: 21 (ref 9–23)
CALCIUM SERPL-MCNC: 9.5 MG/DL (ref 8.7–10.2)
CHLORIDE SERPL-SCNC: 98 MMOL/L (ref 96–106)
CHOLEST SERPL-MCNC: 244 MG/DL (ref 100–199)
CO2 SERPL-SCNC: 23 MMOL/L (ref 20–29)
CREAT SERPL-MCNC: 0.62 MG/DL (ref 0.57–1)
EGFRCR SERPLBLD CKD-EPI 2021: 116 ML/MIN/1.73
ERYTHROCYTE [DISTWIDTH] IN BLOOD BY AUTOMATED COUNT: 13.3 % (ref 11.7–15.4)
GLOBULIN SER CALC-MCNC: 3.2 G/DL (ref 1.5–4.5)
GLUCOSE SERPL-MCNC: 77 MG/DL (ref 70–99)
HCT VFR BLD AUTO: 41.9 % (ref 34–46.6)
HDLC SERPL-MCNC: 95 MG/DL
HGB BLD-MCNC: 13.7 G/DL (ref 11.1–15.9)
LDLC SERPL CALC-MCNC: 138 MG/DL (ref 0–99)
MCH RBC QN AUTO: 29 PG (ref 26.6–33)
MCHC RBC AUTO-ENTMCNC: 32.7 G/DL (ref 31.5–35.7)
MCV RBC AUTO: 89 FL (ref 79–97)
PLATELET # BLD AUTO: 541 X10E3/UL (ref 150–450)
POTASSIUM SERPL-SCNC: 5 MMOL/L (ref 3.5–5.2)
PROT SERPL-MCNC: 8 G/DL (ref 6–8.5)
RBC # BLD AUTO: 4.73 X10E6/UL (ref 3.77–5.28)
SODIUM SERPL-SCNC: 138 MMOL/L (ref 134–144)
T4 FREE SERPL-MCNC: 1.07 NG/DL (ref 0.82–1.77)
TRIGL SERPL-MCNC: 67 MG/DL (ref 0–149)
TSH SERPL DL<=0.005 MIU/L-ACNC: 1.84 UIU/ML (ref 0.45–4.5)
VLDLC SERPL CALC-MCNC: 11 MG/DL (ref 5–40)
WBC # BLD AUTO: 8.7 X10E3/UL (ref 3.4–10.8)

## 2023-08-11 LAB
BASOPHILS # BLD AUTO: 0.1 X10E3/UL (ref 0–0.2)
BASOPHILS NFR BLD AUTO: 1 %
EOSINOPHIL # BLD AUTO: 0.3 X10E3/UL (ref 0–0.4)
EOSINOPHIL NFR BLD AUTO: 4 %
IMM GRANULOCYTES # BLD AUTO: 0 X10E3/UL (ref 0–0.1)
IMM GRANULOCYTES NFR BLD AUTO: 0 %
LYMPHOCYTES # BLD AUTO: 2.7 X10E3/UL (ref 0.7–3.1)
LYMPHOCYTES NFR BLD AUTO: 31 %
MONOCYTES # BLD AUTO: 0.4 X10E3/UL (ref 0.1–0.9)
MONOCYTES NFR BLD AUTO: 5 %
NEUTROPHILS # BLD AUTO: 5.1 X10E3/UL (ref 1.4–7)
NEUTROPHILS NFR BLD AUTO: 59 %
WBC # BLD AUTO: 8.6 X10E3/UL (ref 3.4–10.8)

## 2023-08-13 LAB — SPECIMEN STATUS REPORT: NORMAL

## 2023-08-23 ENCOUNTER — OFFICE VISIT (OUTPATIENT)
Facility: CLINIC | Age: 38
End: 2023-08-23
Payer: MEDICARE

## 2023-08-23 VITALS
BODY MASS INDEX: 30.7 KG/M2 | HEART RATE: 85 BPM | OXYGEN SATURATION: 98 % | HEIGHT: 66 IN | SYSTOLIC BLOOD PRESSURE: 108 MMHG | WEIGHT: 191 LBS | RESPIRATION RATE: 20 BRPM | DIASTOLIC BLOOD PRESSURE: 72 MMHG | TEMPERATURE: 97.1 F

## 2023-08-23 DIAGNOSIS — E66.9 CLASS 1 OBESITY WITH BODY MASS INDEX (BMI) OF 30.0 TO 30.9 IN ADULT, UNSPECIFIED OBESITY TYPE, UNSPECIFIED WHETHER SERIOUS COMORBIDITY PRESENT: ICD-10-CM

## 2023-08-23 DIAGNOSIS — K76.0 NAFLD (NONALCOHOLIC FATTY LIVER DISEASE): ICD-10-CM

## 2023-08-23 DIAGNOSIS — E78.5 HYPERLIPIDEMIA, UNSPECIFIED HYPERLIPIDEMIA TYPE: ICD-10-CM

## 2023-08-23 DIAGNOSIS — Z76.89 ESTABLISHING CARE WITH NEW DOCTOR, ENCOUNTER FOR: ICD-10-CM

## 2023-08-23 DIAGNOSIS — Z78.9 VEGETARIAN: ICD-10-CM

## 2023-08-23 DIAGNOSIS — F10.10 EXCESSIVE DRINKING ALCOHOL: ICD-10-CM

## 2023-08-23 DIAGNOSIS — Z00.00 ROUTINE GENERAL MEDICAL EXAMINATION AT A HEALTH CARE FACILITY: Primary | ICD-10-CM

## 2023-08-23 DIAGNOSIS — D50.8 IRON DEFICIENCY ANEMIA SECONDARY TO INADEQUATE DIETARY IRON INTAKE: ICD-10-CM

## 2023-08-23 DIAGNOSIS — R79.89 ELEVATED PLATELET COUNT: ICD-10-CM

## 2023-08-23 PROCEDURE — 99395 PREV VISIT EST AGE 18-39: CPT | Performed by: STUDENT IN AN ORGANIZED HEALTH CARE EDUCATION/TRAINING PROGRAM

## 2023-08-23 PROCEDURE — 99213 OFFICE O/P EST LOW 20 MIN: CPT | Performed by: STUDENT IN AN ORGANIZED HEALTH CARE EDUCATION/TRAINING PROGRAM

## 2023-08-23 RX ORDER — NALTREXONE HYDROCHLORIDE 50 MG/1
50 TABLET, FILM COATED ORAL DAILY
COMMUNITY

## 2023-08-23 SDOH — ECONOMIC STABILITY: INCOME INSECURITY: HOW HARD IS IT FOR YOU TO PAY FOR THE VERY BASICS LIKE FOOD, HOUSING, MEDICAL CARE, AND HEATING?: NOT HARD AT ALL

## 2023-08-23 SDOH — ECONOMIC STABILITY: HOUSING INSECURITY
IN THE LAST 12 MONTHS, WAS THERE A TIME WHEN YOU DID NOT HAVE A STEADY PLACE TO SLEEP OR SLEPT IN A SHELTER (INCLUDING NOW)?: NO

## 2023-08-23 SDOH — ECONOMIC STABILITY: FOOD INSECURITY: WITHIN THE PAST 12 MONTHS, THE FOOD YOU BOUGHT JUST DIDN'T LAST AND YOU DIDN'T HAVE MONEY TO GET MORE.: NEVER TRUE

## 2023-08-23 SDOH — ECONOMIC STABILITY: FOOD INSECURITY: WITHIN THE PAST 12 MONTHS, YOU WORRIED THAT YOUR FOOD WOULD RUN OUT BEFORE YOU GOT MONEY TO BUY MORE.: NEVER TRUE

## 2023-08-23 ASSESSMENT — PATIENT HEALTH QUESTIONNAIRE - PHQ9
SUM OF ALL RESPONSES TO PHQ QUESTIONS 1-9: 0
SUM OF ALL RESPONSES TO PHQ QUESTIONS 1-9: 0
2. FEELING DOWN, DEPRESSED OR HOPELESS: 0
SUM OF ALL RESPONSES TO PHQ QUESTIONS 1-9: 0
1. LITTLE INTEREST OR PLEASURE IN DOING THINGS: 0
SUM OF ALL RESPONSES TO PHQ QUESTIONS 1-9: 0
SUM OF ALL RESPONSES TO PHQ9 QUESTIONS 1 & 2: 0

## 2023-08-23 ASSESSMENT — ENCOUNTER SYMPTOMS: SHORTNESS OF BREATH: 0

## 2023-08-24 LAB
FERRITIN SERPL-MCNC: 20 NG/ML (ref 15–150)
IRON SATN MFR SERPL: 10 % (ref 15–55)
IRON SERPL-MCNC: 38 UG/DL (ref 27–159)
SPECIMEN STATUS REPORT: NORMAL
TIBC SERPL-MCNC: 364 UG/DL (ref 250–450)
UIBC SERPL-MCNC: 326 UG/DL (ref 131–425)

## 2023-08-24 RX ORDER — LANOLIN ALCOHOL/MO/W.PET/CERES
325 CREAM (GRAM) TOPICAL EVERY OTHER DAY
Qty: 45 TABLET | Refills: 0 | Status: SHIPPED | OUTPATIENT
Start: 2023-08-24

## 2023-08-24 NOTE — RESULT ENCOUNTER NOTE
Iron saturation is mildly low and your ferritin is considered low normal, this could be a result of your vegan diet as your not getting as much iron as you should. I can start you on an iron supplement to take every other day to help increase this.  I will send this to your pharmacy

## 2023-08-25 LAB — APO A-I SERPL-MCNC: 164 MG/DL (ref 116–209)

## 2023-08-29 LAB
FERRITIN SERPL-MCNC: 20 NG/ML (ref 15–150)
IRON SATN MFR SERPL: 10 % (ref 15–55)
IRON SERPL-MCNC: 38 UG/DL (ref 27–159)
TIBC SERPL-MCNC: 364 UG/DL (ref 250–450)
UIBC SERPL-MCNC: 326 UG/DL (ref 131–425)

## 2024-03-06 ENCOUNTER — OFFICE VISIT (OUTPATIENT)
Facility: CLINIC | Age: 39
End: 2024-03-06
Payer: MEDICARE

## 2024-03-06 VITALS
SYSTOLIC BLOOD PRESSURE: 103 MMHG | TEMPERATURE: 98 F | HEART RATE: 81 BPM | HEIGHT: 66 IN | BODY MASS INDEX: 31.82 KG/M2 | DIASTOLIC BLOOD PRESSURE: 70 MMHG | RESPIRATION RATE: 20 BRPM | OXYGEN SATURATION: 95 % | WEIGHT: 198 LBS

## 2024-03-06 DIAGNOSIS — E66.9 CLASS 1 OBESITY WITH BODY MASS INDEX (BMI) OF 31.0 TO 31.9 IN ADULT, UNSPECIFIED OBESITY TYPE, UNSPECIFIED WHETHER SERIOUS COMORBIDITY PRESENT: ICD-10-CM

## 2024-03-06 DIAGNOSIS — R06.83 SNORING: Primary | ICD-10-CM

## 2024-03-06 PROCEDURE — 99213 OFFICE O/P EST LOW 20 MIN: CPT | Performed by: STUDENT IN AN ORGANIZED HEALTH CARE EDUCATION/TRAINING PROGRAM

## 2024-03-06 ASSESSMENT — PATIENT HEALTH QUESTIONNAIRE - PHQ9
SUM OF ALL RESPONSES TO PHQ QUESTIONS 1-9: 0
5. POOR APPETITE OR OVEREATING: 0
SUM OF ALL RESPONSES TO PHQ9 QUESTIONS 1 & 2: 0
SUM OF ALL RESPONSES TO PHQ QUESTIONS 1-9: 0
4. FEELING TIRED OR HAVING LITTLE ENERGY: 0
SUM OF ALL RESPONSES TO PHQ QUESTIONS 1-9: 0
2. FEELING DOWN, DEPRESSED OR HOPELESS: 0
SUM OF ALL RESPONSES TO PHQ QUESTIONS 1-9: 0
SUM OF ALL RESPONSES TO PHQ9 QUESTIONS 1 & 2: 0
3. TROUBLE FALLING OR STAYING ASLEEP: 0
8. MOVING OR SPEAKING SO SLOWLY THAT OTHER PEOPLE COULD HAVE NOTICED. OR THE OPPOSITE, BEING SO FIGETY OR RESTLESS THAT YOU HAVE BEEN MOVING AROUND A LOT MORE THAN USUAL: 0
SUM OF ALL RESPONSES TO PHQ QUESTIONS 1-9: 0
SUM OF ALL RESPONSES TO PHQ QUESTIONS 1-9: 0
6. FEELING BAD ABOUT YOURSELF - OR THAT YOU ARE A FAILURE OR HAVE LET YOURSELF OR YOUR FAMILY DOWN: 0
1. LITTLE INTEREST OR PLEASURE IN DOING THINGS: 0
7. TROUBLE CONCENTRATING ON THINGS, SUCH AS READING THE NEWSPAPER OR WATCHING TELEVISION: 0
SUM OF ALL RESPONSES TO PHQ QUESTIONS 1-9: 0
9. THOUGHTS THAT YOU WOULD BE BETTER OFF DEAD, OR OF HURTING YOURSELF: 0
2. FEELING DOWN, DEPRESSED OR HOPELESS: 0
10. IF YOU CHECKED OFF ANY PROBLEMS, HOW DIFFICULT HAVE THESE PROBLEMS MADE IT FOR YOU TO DO YOUR WORK, TAKE CARE OF THINGS AT HOME, OR GET ALONG WITH OTHER PEOPLE: 0
1. LITTLE INTEREST OR PLEASURE IN DOING THINGS: 0
SUM OF ALL RESPONSES TO PHQ QUESTIONS 1-9: 0

## 2024-03-06 ASSESSMENT — ENCOUNTER SYMPTOMS
ABDOMINAL PAIN: 0
SHORTNESS OF BREATH: 0

## 2024-03-06 NOTE — PROGRESS NOTES
\"Have you been to the ER, urgent care clinic since your last visit?  Hospitalized since your last visit?\"    NO    “Have you seen or consulted any other health care providers outside of [facility] since your last visit?”    Yes 6 weeks ago     addiction specialist

## 2024-03-06 NOTE — PROGRESS NOTES
HISTORY OF PRESENT ILLNESS  Eugenia Walters is a 38 y.o. female presenting today for   Chief Complaint   Patient presents with    Sleep Problem        Snoring- Started initially when with her first baby this is worsened. She denies waking up gasping for air. She does report waking up with morning headahces. She is always fatigued but she does have young children who are not sleeping through the night.     STOPBANG questionnaire     Do you Snore loudly? Yes  Do you often feel Tired, fatigued, or sleepy during the daytime? Yes  Has anyone Observed you stop breathing during your sleep? No  Are you being treated for high blood Pressure? No  BMI more than 35 kg/m2? No  Age over 50 years old? No  Neck Circumference >16 inches?   Gender male? No  ______________________________________     SCORE: 3     If YES to 0 - 2, low risk of sleep apnea  If YES to 3 - 4 intermediate risk of having sleep apnea  If YES to 5 - 8 high risk of having sleep apnea (or 2 + BMI 35 or Neck > 17\" or Male)             Review of Systems   Eyes:  Negative for visual disturbance.   Respiratory:  Negative for shortness of breath.         Snoring   Cardiovascular:  Negative for chest pain.   Gastrointestinal:  Negative for abdominal pain.   Neurological:  Negative for headaches.         /70   Pulse 81   Temp 98 °F (36.7 °C) (Skin)   Resp 20   Ht 1.676 m (5' 6\")   Wt 89.8 kg (198 lb)   LMP 02/21/2024   SpO2 95%   BMI 31.96 kg/m²     Physical Exam  HENT:      Mouth/Throat:      Comments: MASK  Cardiovascular:      Rate and Rhythm: Normal rate and regular rhythm.      Pulses: Normal pulses.   Pulmonary:      Effort: Pulmonary effort is normal.      Breath sounds: Normal breath sounds.   Psychiatric:         Mood and Affect: Mood normal.         ASSESSMENT and PLAN    ICD-10-CM    1. Snoring  R06.83 [facility] - Cindy Guzman DO, Sleep Medicine, [place] ([facility])      2. Class 1 obesity with body mass index (BMI) of 31.0 to 31.9 in

## 2025-02-11 NOTE — PROGRESS NOTES
Lv:01/29/2025  Fu: n/a      Patient presents to the clinic for itching on her body that began 3 weeks ago. Patient states she tried hydrocortisone and received no relief.